# Patient Record
Sex: FEMALE | Race: WHITE | Employment: OTHER | URBAN - METROPOLITAN AREA
[De-identification: names, ages, dates, MRNs, and addresses within clinical notes are randomized per-mention and may not be internally consistent; named-entity substitution may affect disease eponyms.]

---

## 2017-02-10 ENCOUNTER — HOME HEALTH ADMISSION (OUTPATIENT)
Dept: HOME HEALTH SERVICES | Facility: HOME HEALTH | Age: 82
End: 2017-02-10
Payer: MEDICARE

## 2017-02-14 ENCOUNTER — HOME CARE VISIT (OUTPATIENT)
Dept: SCHEDULING | Facility: HOME HEALTH | Age: 82
End: 2017-02-14
Payer: MEDICARE

## 2017-02-14 VITALS
RESPIRATION RATE: 16 BRPM | OXYGEN SATURATION: 92 % | DIASTOLIC BLOOD PRESSURE: 78 MMHG | SYSTOLIC BLOOD PRESSURE: 136 MMHG | HEART RATE: 79 BPM | TEMPERATURE: 98.7 F

## 2017-02-14 PROCEDURE — 3331090002 HH PPS REVENUE DEBIT

## 2017-02-14 PROCEDURE — 400013 HH SOC

## 2017-02-14 PROCEDURE — 3331090001 HH PPS REVENUE CREDIT

## 2017-02-14 PROCEDURE — G0151 HHCP-SERV OF PT,EA 15 MIN: HCPCS

## 2017-02-15 PROCEDURE — 3331090001 HH PPS REVENUE CREDIT

## 2017-02-15 PROCEDURE — 3331090002 HH PPS REVENUE DEBIT

## 2017-02-16 ENCOUNTER — HOME CARE VISIT (OUTPATIENT)
Dept: SCHEDULING | Facility: HOME HEALTH | Age: 82
End: 2017-02-16
Payer: MEDICARE

## 2017-02-16 VITALS
DIASTOLIC BLOOD PRESSURE: 64 MMHG | OXYGEN SATURATION: 97 % | TEMPERATURE: 97.1 F | HEART RATE: 70 BPM | SYSTOLIC BLOOD PRESSURE: 126 MMHG

## 2017-02-16 PROCEDURE — G0157 HHC PT ASSISTANT EA 15: HCPCS

## 2017-02-16 PROCEDURE — 3331090002 HH PPS REVENUE DEBIT

## 2017-02-16 PROCEDURE — 3331090001 HH PPS REVENUE CREDIT

## 2017-02-16 PROCEDURE — G0299 HHS/HOSPICE OF RN EA 15 MIN: HCPCS

## 2017-02-17 VITALS
HEART RATE: 70 BPM | OXYGEN SATURATION: 96 % | DIASTOLIC BLOOD PRESSURE: 64 MMHG | SYSTOLIC BLOOD PRESSURE: 126 MMHG | RESPIRATION RATE: 16 BRPM | TEMPERATURE: 97.1 F

## 2017-02-17 PROCEDURE — 3331090002 HH PPS REVENUE DEBIT

## 2017-02-17 PROCEDURE — 3331090001 HH PPS REVENUE CREDIT

## 2017-02-18 PROCEDURE — 3331090001 HH PPS REVENUE CREDIT

## 2017-02-18 PROCEDURE — 3331090002 HH PPS REVENUE DEBIT

## 2017-02-19 PROCEDURE — 3331090002 HH PPS REVENUE DEBIT

## 2017-02-19 PROCEDURE — 3331090001 HH PPS REVENUE CREDIT

## 2017-02-20 PROCEDURE — 3331090001 HH PPS REVENUE CREDIT

## 2017-02-20 PROCEDURE — 3331090002 HH PPS REVENUE DEBIT

## 2017-02-21 ENCOUNTER — HOME CARE VISIT (OUTPATIENT)
Dept: SCHEDULING | Facility: HOME HEALTH | Age: 82
End: 2017-02-21
Payer: MEDICARE

## 2017-02-21 VITALS
HEART RATE: 55 BPM | TEMPERATURE: 98 F | SYSTOLIC BLOOD PRESSURE: 130 MMHG | OXYGEN SATURATION: 96 % | DIASTOLIC BLOOD PRESSURE: 70 MMHG

## 2017-02-21 PROCEDURE — 3331090001 HH PPS REVENUE CREDIT

## 2017-02-21 PROCEDURE — 3331090002 HH PPS REVENUE DEBIT

## 2017-02-21 PROCEDURE — G0157 HHC PT ASSISTANT EA 15: HCPCS

## 2017-02-22 ENCOUNTER — HOME CARE VISIT (OUTPATIENT)
Dept: SCHEDULING | Facility: HOME HEALTH | Age: 82
End: 2017-02-22
Payer: MEDICARE

## 2017-02-22 PROCEDURE — 3331090002 HH PPS REVENUE DEBIT

## 2017-02-22 PROCEDURE — 3331090001 HH PPS REVENUE CREDIT

## 2017-02-23 ENCOUNTER — HOME CARE VISIT (OUTPATIENT)
Dept: SCHEDULING | Facility: HOME HEALTH | Age: 82
End: 2017-02-23
Payer: MEDICARE

## 2017-02-23 PROCEDURE — G0157 HHC PT ASSISTANT EA 15: HCPCS

## 2017-02-23 PROCEDURE — 3331090001 HH PPS REVENUE CREDIT

## 2017-02-23 PROCEDURE — 3331090002 HH PPS REVENUE DEBIT

## 2017-02-24 VITALS
DIASTOLIC BLOOD PRESSURE: 76 MMHG | OXYGEN SATURATION: 96 % | HEART RATE: 80 BPM | TEMPERATURE: 98.7 F | SYSTOLIC BLOOD PRESSURE: 132 MMHG

## 2017-02-24 PROCEDURE — 3331090001 HH PPS REVENUE CREDIT

## 2017-02-24 PROCEDURE — 3331090002 HH PPS REVENUE DEBIT

## 2017-02-25 PROCEDURE — 3331090001 HH PPS REVENUE CREDIT

## 2017-02-25 PROCEDURE — 3331090002 HH PPS REVENUE DEBIT

## 2017-02-26 PROCEDURE — 3331090002 HH PPS REVENUE DEBIT

## 2017-02-26 PROCEDURE — 3331090001 HH PPS REVENUE CREDIT

## 2017-02-27 PROCEDURE — 3331090001 HH PPS REVENUE CREDIT

## 2017-02-27 PROCEDURE — 3331090002 HH PPS REVENUE DEBIT

## 2017-02-28 ENCOUNTER — HOME CARE VISIT (OUTPATIENT)
Dept: SCHEDULING | Facility: HOME HEALTH | Age: 82
End: 2017-02-28
Payer: MEDICARE

## 2017-02-28 PROCEDURE — G0157 HHC PT ASSISTANT EA 15: HCPCS

## 2017-02-28 PROCEDURE — 3331090001 HH PPS REVENUE CREDIT

## 2017-02-28 PROCEDURE — 3331090002 HH PPS REVENUE DEBIT

## 2017-03-01 VITALS
HEART RATE: 68 BPM | DIASTOLIC BLOOD PRESSURE: 78 MMHG | TEMPERATURE: 97.3 F | OXYGEN SATURATION: 99 % | SYSTOLIC BLOOD PRESSURE: 148 MMHG

## 2017-03-01 PROCEDURE — 3331090001 HH PPS REVENUE CREDIT

## 2017-03-01 PROCEDURE — 3331090002 HH PPS REVENUE DEBIT

## 2017-03-02 ENCOUNTER — HOME CARE VISIT (OUTPATIENT)
Dept: SCHEDULING | Facility: HOME HEALTH | Age: 82
End: 2017-03-02
Payer: MEDICARE

## 2017-03-02 VITALS
TEMPERATURE: 97.7 F | SYSTOLIC BLOOD PRESSURE: 140 MMHG | DIASTOLIC BLOOD PRESSURE: 68 MMHG | OXYGEN SATURATION: 99 % | HEART RATE: 72 BPM

## 2017-03-02 PROCEDURE — 3331090002 HH PPS REVENUE DEBIT

## 2017-03-02 PROCEDURE — G0157 HHC PT ASSISTANT EA 15: HCPCS

## 2017-03-02 PROCEDURE — 3331090001 HH PPS REVENUE CREDIT

## 2017-03-03 PROCEDURE — 3331090002 HH PPS REVENUE DEBIT

## 2017-03-03 PROCEDURE — 3331090001 HH PPS REVENUE CREDIT

## 2017-03-04 PROCEDURE — 3331090002 HH PPS REVENUE DEBIT

## 2017-03-04 PROCEDURE — 3331090001 HH PPS REVENUE CREDIT

## 2017-03-05 PROCEDURE — 3331090001 HH PPS REVENUE CREDIT

## 2017-03-05 PROCEDURE — 3331090002 HH PPS REVENUE DEBIT

## 2017-03-06 ENCOUNTER — HOME CARE VISIT (OUTPATIENT)
Dept: SCHEDULING | Facility: HOME HEALTH | Age: 82
End: 2017-03-06
Payer: MEDICARE

## 2017-03-06 VITALS
DIASTOLIC BLOOD PRESSURE: 74 MMHG | SYSTOLIC BLOOD PRESSURE: 128 MMHG | HEART RATE: 60 BPM | TEMPERATURE: 97.7 F | OXYGEN SATURATION: 98 %

## 2017-03-06 PROCEDURE — 3331090002 HH PPS REVENUE DEBIT

## 2017-03-06 PROCEDURE — G0157 HHC PT ASSISTANT EA 15: HCPCS

## 2017-03-06 PROCEDURE — 3331090001 HH PPS REVENUE CREDIT

## 2017-03-07 PROCEDURE — 3331090002 HH PPS REVENUE DEBIT

## 2017-03-07 PROCEDURE — 3331090001 HH PPS REVENUE CREDIT

## 2017-03-08 ENCOUNTER — HOME CARE VISIT (OUTPATIENT)
Dept: SCHEDULING | Facility: HOME HEALTH | Age: 82
End: 2017-03-08
Payer: MEDICARE

## 2017-03-08 VITALS
HEART RATE: 82 BPM | DIASTOLIC BLOOD PRESSURE: 80 MMHG | TEMPERATURE: 96.7 F | OXYGEN SATURATION: 91 % | RESPIRATION RATE: 18 BRPM | SYSTOLIC BLOOD PRESSURE: 122 MMHG

## 2017-03-08 PROCEDURE — 3331090001 HH PPS REVENUE CREDIT

## 2017-03-08 PROCEDURE — G0151 HHCP-SERV OF PT,EA 15 MIN: HCPCS

## 2017-03-08 PROCEDURE — 3331090002 HH PPS REVENUE DEBIT

## 2017-09-07 PROBLEM — E11.9 TYPE 2 DIABETES MELLITUS WITHOUT COMPLICATION, WITHOUT LONG-TERM CURRENT USE OF INSULIN (HCC): Status: ACTIVE | Noted: 2017-09-07

## 2017-11-09 ENCOUNTER — APPOINTMENT (OUTPATIENT)
Dept: CT IMAGING | Age: 82
DRG: 065 | End: 2017-11-09
Attending: EMERGENCY MEDICINE
Payer: MEDICARE

## 2017-11-09 ENCOUNTER — HOSPITAL ENCOUNTER (INPATIENT)
Age: 82
LOS: 5 days | Discharge: NURSING, REHAB OR LONG-TERM CARE | DRG: 065 | End: 2017-11-14
Attending: EMERGENCY MEDICINE | Admitting: INTERNAL MEDICINE
Payer: MEDICARE

## 2017-11-09 ENCOUNTER — APPOINTMENT (OUTPATIENT)
Dept: GENERAL RADIOLOGY | Age: 82
DRG: 065 | End: 2017-11-09
Attending: EMERGENCY MEDICINE
Payer: MEDICARE

## 2017-11-09 DIAGNOSIS — R40.4 TRANSIENT ALTERATION OF AWARENESS: Primary | ICD-10-CM

## 2017-11-09 DIAGNOSIS — N39.0 URINARY TRACT INFECTION WITHOUT HEMATURIA, SITE UNSPECIFIED: ICD-10-CM

## 2017-11-09 PROBLEM — I16.1 HYPERTENSIVE EMERGENCY: Status: ACTIVE | Noted: 2017-11-09

## 2017-11-09 PROBLEM — G45.9 TIA (TRANSIENT ISCHEMIC ATTACK): Status: ACTIVE | Noted: 2017-11-09

## 2017-11-09 PROBLEM — R53.1 WEAKNESS: Status: ACTIVE | Noted: 2017-11-09

## 2017-11-09 LAB
ALBUMIN SERPL-MCNC: 3.8 G/DL (ref 3.2–4.6)
ALBUMIN/GLOB SERPL: 1.1 {RATIO} (ref 1.2–3.5)
ALP SERPL-CCNC: 63 U/L (ref 50–136)
ALT SERPL-CCNC: 15 U/L (ref 12–65)
ANION GAP SERPL CALC-SCNC: 5 MMOL/L (ref 7–16)
AST SERPL-CCNC: 17 U/L (ref 15–37)
ATRIAL RATE: 55 BPM
BACTERIA URNS QL MICRO: ABNORMAL /HPF
BASOPHILS # BLD: 0 K/UL (ref 0–0.2)
BASOPHILS NFR BLD: 0 % (ref 0–2)
BILIRUB SERPL-MCNC: 0.7 MG/DL (ref 0.2–1.1)
BUN SERPL-MCNC: 25 MG/DL (ref 8–23)
CALCIUM SERPL-MCNC: 10.9 MG/DL (ref 8.3–10.4)
CALCULATED P AXIS, ECG09: 106 DEGREES
CALCULATED R AXIS, ECG10: -57 DEGREES
CALCULATED T AXIS, ECG11: -11 DEGREES
CASTS URNS QL MICRO: ABNORMAL /LPF
CHLORIDE SERPL-SCNC: 107 MMOL/L (ref 98–107)
CO2 SERPL-SCNC: 28 MMOL/L (ref 21–32)
CREAT SERPL-MCNC: 1.14 MG/DL (ref 0.6–1)
DIAGNOSIS, 93000: NORMAL
DIFFERENTIAL METHOD BLD: ABNORMAL
EOSINOPHIL # BLD: 0.1 K/UL (ref 0–0.8)
EOSINOPHIL NFR BLD: 1 % (ref 0.5–7.8)
EPI CELLS #/AREA URNS HPF: 0 /HPF
ERYTHROCYTE [DISTWIDTH] IN BLOOD BY AUTOMATED COUNT: 14.5 % (ref 11.9–14.6)
GLOBULIN SER CALC-MCNC: 3.6 G/DL (ref 2.3–3.5)
GLUCOSE SERPL-MCNC: 96 MG/DL (ref 65–100)
HCT VFR BLD AUTO: 40.8 % (ref 35.8–46.3)
HGB BLD-MCNC: 13.6 G/DL (ref 11.7–15.4)
IMM GRANULOCYTES # BLD: 0 K/UL (ref 0–0.5)
IMM GRANULOCYTES NFR BLD AUTO: 0 % (ref 0–5)
INR PPP: 1 (ref 0.9–1.2)
LYMPHOCYTES # BLD: 1.7 K/UL (ref 0.5–4.6)
LYMPHOCYTES NFR BLD: 25 % (ref 13–44)
MCH RBC QN AUTO: 29.6 PG (ref 26.1–32.9)
MCHC RBC AUTO-ENTMCNC: 33.3 G/DL (ref 31.4–35)
MCV RBC AUTO: 88.9 FL (ref 79.6–97.8)
MONOCYTES # BLD: 0.6 K/UL (ref 0.1–1.3)
MONOCYTES NFR BLD: 9 % (ref 4–12)
NEUTS SEG # BLD: 4.4 K/UL (ref 1.7–8.2)
NEUTS SEG NFR BLD: 65 % (ref 43–78)
P-R INTERVAL, ECG05: 206 MS
PLATELET # BLD AUTO: 249 K/UL (ref 150–450)
PMV BLD AUTO: 10.6 FL (ref 10.8–14.1)
POTASSIUM SERPL-SCNC: 4.5 MMOL/L (ref 3.5–5.1)
PROT SERPL-MCNC: 7.4 G/DL (ref 6.3–8.2)
PROTHROMBIN TIME: 10.5 SEC (ref 9.6–12)
Q-T INTERVAL, ECG07: 462 MS
QRS DURATION, ECG06: 116 MS
QTC CALCULATION (BEZET), ECG08: 441 MS
RBC # BLD AUTO: 4.59 M/UL (ref 4.05–5.25)
RBC #/AREA URNS HPF: ABNORMAL /HPF
SODIUM SERPL-SCNC: 140 MMOL/L (ref 136–145)
VENTRICULAR RATE, ECG03: 55 BPM
WBC # BLD AUTO: 6.8 K/UL (ref 4.3–11.1)
WBC URNS QL MICRO: ABNORMAL /HPF

## 2017-11-09 PROCEDURE — 87186 SC STD MICRODIL/AGAR DIL: CPT | Performed by: EMERGENCY MEDICINE

## 2017-11-09 PROCEDURE — 93005 ELECTROCARDIOGRAM TRACING: CPT | Performed by: EMERGENCY MEDICINE

## 2017-11-09 PROCEDURE — 70450 CT HEAD/BRAIN W/O DYE: CPT

## 2017-11-09 PROCEDURE — 96366 THER/PROPH/DIAG IV INF ADDON: CPT | Performed by: EMERGENCY MEDICINE

## 2017-11-09 PROCEDURE — 74011000258 HC RX REV CODE- 258: Performed by: EMERGENCY MEDICINE

## 2017-11-09 PROCEDURE — 74011250636 HC RX REV CODE- 250/636: Performed by: EMERGENCY MEDICINE

## 2017-11-09 PROCEDURE — 71010 XR CHEST PORT: CPT

## 2017-11-09 PROCEDURE — 81015 MICROSCOPIC EXAM OF URINE: CPT | Performed by: EMERGENCY MEDICINE

## 2017-11-09 PROCEDURE — 87088 URINE BACTERIA CULTURE: CPT | Performed by: EMERGENCY MEDICINE

## 2017-11-09 PROCEDURE — 87086 URINE CULTURE/COLONY COUNT: CPT | Performed by: EMERGENCY MEDICINE

## 2017-11-09 PROCEDURE — 85610 PROTHROMBIN TIME: CPT | Performed by: EMERGENCY MEDICINE

## 2017-11-09 PROCEDURE — 74011000250 HC RX REV CODE- 250: Performed by: INTERNAL MEDICINE

## 2017-11-09 PROCEDURE — 65610000006 HC RM INTENSIVE CARE

## 2017-11-09 PROCEDURE — 99285 EMERGENCY DEPT VISIT HI MDM: CPT | Performed by: EMERGENCY MEDICINE

## 2017-11-09 PROCEDURE — 80053 COMPREHEN METABOLIC PANEL: CPT | Performed by: EMERGENCY MEDICINE

## 2017-11-09 PROCEDURE — 96367 TX/PROPH/DG ADDL SEQ IV INF: CPT | Performed by: EMERGENCY MEDICINE

## 2017-11-09 PROCEDURE — 96365 THER/PROPH/DIAG IV INF INIT: CPT | Performed by: EMERGENCY MEDICINE

## 2017-11-09 PROCEDURE — 93005 ELECTROCARDIOGRAM TRACING: CPT | Performed by: INTERNAL MEDICINE

## 2017-11-09 PROCEDURE — 85025 COMPLETE CBC W/AUTO DIFF WBC: CPT | Performed by: EMERGENCY MEDICINE

## 2017-11-09 RX ORDER — NICARDIPINE HYDROCHLORIDE 0.1 MG/ML
5-15 INJECTION INTRAVENOUS
Status: DISCONTINUED | OUTPATIENT
Start: 2017-11-09 | End: 2017-11-10

## 2017-11-09 RX ORDER — ROPINIROLE 0.5 MG/1
0.5 TABLET, FILM COATED ORAL
COMMUNITY

## 2017-11-09 RX ORDER — SODIUM CHLORIDE 0.9 % (FLUSH) 0.9 %
5-10 SYRINGE (ML) INJECTION AS NEEDED
Status: DISCONTINUED | OUTPATIENT
Start: 2017-11-09 | End: 2017-11-13 | Stop reason: SDUPTHER

## 2017-11-09 RX ORDER — SODIUM CHLORIDE 0.9 % (FLUSH) 0.9 %
5-10 SYRINGE (ML) INJECTION EVERY 8 HOURS
Status: DISCONTINUED | OUTPATIENT
Start: 2017-11-09 | End: 2017-11-13 | Stop reason: SDUPTHER

## 2017-11-09 RX ORDER — ACETAMINOPHEN 325 MG/1
650 TABLET ORAL
Status: DISCONTINUED | OUTPATIENT
Start: 2017-11-09 | End: 2017-11-14 | Stop reason: HOSPADM

## 2017-11-09 RX ADMIN — NICARDIPINE HYDROCHLORIDE 5 MG/HR: 0.1 INJECTION, SOLUTION INTRAVENOUS at 22:05

## 2017-11-09 RX ADMIN — CEFTRIAXONE SODIUM 1 G: 1 INJECTION, POWDER, FOR SOLUTION INTRAMUSCULAR; INTRAVENOUS at 20:56

## 2017-11-09 NOTE — Clinical Note
Status[de-identified] Inpatient [101] Type of Bed: Intensive Care [6] Inpatient Hospitalization Certified Necessary for the Following Reasons: 4. Patient requires ICU level of care interventions (further clarification in H&P documentation) Admitting Diagnosis: Hypertensive emergency [2486413] Admitting Diagnosis: UTI (urinary tract infection) [594898] Admitting Physician: Jyothi Torrez [[id number]] Attending Physician: Jyothi Torrez [[id number]] Estimated Length of Stay: 3-4 Midnights Discharge Plan[de-identified] Extended Care Facility (e.g. Adult Home, Nursing Home, etc.)

## 2017-11-09 NOTE — ED TRIAGE NOTES
Pt arrives EMS for general weakness, difficulty ambulating. Pt states she has no complaints, no pain, no cough, no fever. Didn't feel like getting out of bed, felt more drowsy today than normal. Temp 97.3 oral for EMS, , HR 60s, /78. Pt has not eaten or drank much today. Pt's family states she had morning medications. Pt's family also states she normally walks with a walker but did not have energy to do that today. Pt has history of strokes, last one was 5-6 years ago. Family states symptoms this morning were she was having trouble moving left side. Family states they have noticed a left sided droop and she has been somewhat drooling from that side today. Last seen normal yesterday. Pt states symptoms began this morning and last night she felt fine.

## 2017-11-10 ENCOUNTER — APPOINTMENT (OUTPATIENT)
Dept: MRI IMAGING | Age: 82
DRG: 065 | End: 2017-11-10
Attending: INTERNAL MEDICINE
Payer: MEDICARE

## 2017-11-10 ENCOUNTER — APPOINTMENT (OUTPATIENT)
Dept: ULTRASOUND IMAGING | Age: 82
DRG: 065 | End: 2017-11-10
Attending: INTERNAL MEDICINE
Payer: MEDICARE

## 2017-11-10 PROBLEM — I63.9 ISCHEMIC STROKE OF FRONTAL LOBE (HCC): Status: ACTIVE | Noted: 2017-11-10

## 2017-11-10 LAB
ANION GAP SERPL CALC-SCNC: 11 MMOL/L (ref 7–16)
ATRIAL RATE: 61 BPM
BUN SERPL-MCNC: 22 MG/DL (ref 8–23)
CALCIUM SERPL-MCNC: 10.1 MG/DL (ref 8.3–10.4)
CALCULATED P AXIS, ECG09: 92 DEGREES
CALCULATED R AXIS, ECG10: -65 DEGREES
CALCULATED T AXIS, ECG11: 13 DEGREES
CHLORIDE SERPL-SCNC: 107 MMOL/L (ref 98–107)
CHOLEST SERPL-MCNC: 189 MG/DL
CO2 SERPL-SCNC: 22 MMOL/L (ref 21–32)
CREAT SERPL-MCNC: 1.04 MG/DL (ref 0.6–1)
DIAGNOSIS, 93000: NORMAL
EST. AVERAGE GLUCOSE BLD GHB EST-MCNC: 100 MG/DL
GLUCOSE BLD STRIP.AUTO-MCNC: 117 MG/DL (ref 65–100)
GLUCOSE SERPL-MCNC: 78 MG/DL (ref 65–100)
HBA1C MFR BLD: 5.1 % (ref 4.8–6)
HDLC SERPL-MCNC: 68 MG/DL (ref 40–60)
HDLC SERPL: 2.8 {RATIO}
LDLC SERPL CALC-MCNC: 109.8 MG/DL
LIPID PROFILE,FLP: ABNORMAL
P-R INTERVAL, ECG05: 196 MS
POTASSIUM SERPL-SCNC: 4.4 MMOL/L (ref 3.5–5.1)
Q-T INTERVAL, ECG07: 418 MS
QRS DURATION, ECG06: 116 MS
QTC CALCULATION (BEZET), ECG08: 420 MS
SODIUM SERPL-SCNC: 140 MMOL/L (ref 136–145)
TRIGL SERPL-MCNC: 56 MG/DL (ref 35–150)
TSH SERPL DL<=0.005 MIU/L-ACNC: 1.38 UIU/ML (ref 0.36–3.74)
VENTRICULAR RATE, ECG03: 61 BPM
VLDLC SERPL CALC-MCNC: 11.2 MG/DL (ref 6–23)

## 2017-11-10 PROCEDURE — 74011250637 HC RX REV CODE- 250/637: Performed by: INTERNAL MEDICINE

## 2017-11-10 PROCEDURE — 83036 HEMOGLOBIN GLYCOSYLATED A1C: CPT | Performed by: INTERNAL MEDICINE

## 2017-11-10 PROCEDURE — 93306 TTE W/DOPPLER COMPLETE: CPT

## 2017-11-10 PROCEDURE — 74011000258 HC RX REV CODE- 258: Performed by: INTERNAL MEDICINE

## 2017-11-10 PROCEDURE — 97161 PT EVAL LOW COMPLEX 20 MIN: CPT

## 2017-11-10 PROCEDURE — 80061 LIPID PANEL: CPT | Performed by: INTERNAL MEDICINE

## 2017-11-10 PROCEDURE — 36415 COLL VENOUS BLD VENIPUNCTURE: CPT | Performed by: INTERNAL MEDICINE

## 2017-11-10 PROCEDURE — 92610 EVALUATE SWALLOWING FUNCTION: CPT

## 2017-11-10 PROCEDURE — 97112 NEUROMUSCULAR REEDUCATION: CPT

## 2017-11-10 PROCEDURE — 65660000000 HC RM CCU STEPDOWN

## 2017-11-10 PROCEDURE — 84443 ASSAY THYROID STIM HORMONE: CPT | Performed by: INTERNAL MEDICINE

## 2017-11-10 PROCEDURE — 97162 PT EVAL MOD COMPLEX 30 MIN: CPT

## 2017-11-10 PROCEDURE — 82962 GLUCOSE BLOOD TEST: CPT

## 2017-11-10 PROCEDURE — 77030019605

## 2017-11-10 PROCEDURE — 74011250636 HC RX REV CODE- 250/636: Performed by: INTERNAL MEDICINE

## 2017-11-10 PROCEDURE — 97166 OT EVAL MOD COMPLEX 45 MIN: CPT

## 2017-11-10 PROCEDURE — 70551 MRI BRAIN STEM W/O DYE: CPT

## 2017-11-10 PROCEDURE — 80048 BASIC METABOLIC PNL TOTAL CA: CPT | Performed by: INTERNAL MEDICINE

## 2017-11-10 PROCEDURE — 93880 EXTRACRANIAL BILAT STUDY: CPT

## 2017-11-10 RX ORDER — HYDRALAZINE HYDROCHLORIDE 20 MG/ML
10 INJECTION INTRAMUSCULAR; INTRAVENOUS
Status: DISCONTINUED | OUTPATIENT
Start: 2017-11-10 | End: 2017-11-14 | Stop reason: HOSPADM

## 2017-11-10 RX ORDER — DONEPEZIL HYDROCHLORIDE 5 MG/1
5 TABLET, FILM COATED ORAL
Status: DISCONTINUED | OUTPATIENT
Start: 2017-11-10 | End: 2017-11-14 | Stop reason: HOSPADM

## 2017-11-10 RX ORDER — SODIUM CHLORIDE 9 MG/ML
75 INJECTION, SOLUTION INTRAVENOUS CONTINUOUS
Status: DISCONTINUED | OUTPATIENT
Start: 2017-11-10 | End: 2017-11-11

## 2017-11-10 RX ORDER — ROPINIROLE 0.5 MG/1
0.5 TABLET, FILM COATED ORAL
Status: DISCONTINUED | OUTPATIENT
Start: 2017-11-10 | End: 2017-11-14 | Stop reason: HOSPADM

## 2017-11-10 RX ORDER — HEPARIN SODIUM 5000 [USP'U]/ML
5000 INJECTION, SOLUTION INTRAVENOUS; SUBCUTANEOUS EVERY 8 HOURS
Status: DISCONTINUED | OUTPATIENT
Start: 2017-11-10 | End: 2017-11-14 | Stop reason: HOSPADM

## 2017-11-10 RX ORDER — SODIUM CHLORIDE 0.9 % (FLUSH) 0.9 %
5-10 SYRINGE (ML) INJECTION EVERY 8 HOURS
Status: DISCONTINUED | OUTPATIENT
Start: 2017-11-10 | End: 2017-11-14 | Stop reason: HOSPADM

## 2017-11-10 RX ORDER — ATORVASTATIN CALCIUM 40 MG/1
80 TABLET, FILM COATED ORAL
Status: DISCONTINUED | OUTPATIENT
Start: 2017-11-10 | End: 2017-11-14 | Stop reason: HOSPADM

## 2017-11-10 RX ORDER — ASPIRIN 81 MG/1
81 TABLET ORAL DAILY
Status: DISCONTINUED | OUTPATIENT
Start: 2017-11-10 | End: 2017-11-10

## 2017-11-10 RX ORDER — VALSARTAN 160 MG/1
160 TABLET ORAL DAILY
Status: DISCONTINUED | OUTPATIENT
Start: 2017-11-10 | End: 2017-11-14 | Stop reason: HOSPADM

## 2017-11-10 RX ORDER — ASPIRIN 325 MG
325 TABLET ORAL DAILY
Status: DISCONTINUED | OUTPATIENT
Start: 2017-11-11 | End: 2017-11-14 | Stop reason: HOSPADM

## 2017-11-10 RX ORDER — SODIUM CHLORIDE 0.9 % (FLUSH) 0.9 %
5-10 SYRINGE (ML) INJECTION AS NEEDED
Status: DISCONTINUED | OUTPATIENT
Start: 2017-11-10 | End: 2017-11-14 | Stop reason: HOSPADM

## 2017-11-10 RX ADMIN — SODIUM CHLORIDE 75 ML/HR: 900 INJECTION, SOLUTION INTRAVENOUS at 16:48

## 2017-11-10 RX ADMIN — Medication 5 ML: at 14:00

## 2017-11-10 RX ADMIN — HEPARIN SODIUM 5000 UNITS: 5000 INJECTION, SOLUTION INTRAVENOUS; SUBCUTANEOUS at 22:40

## 2017-11-10 RX ADMIN — DONEPEZIL HYDROCHLORIDE 5 MG: 5 TABLET, FILM COATED ORAL at 22:41

## 2017-11-10 RX ADMIN — Medication 10 ML: at 06:00

## 2017-11-10 RX ADMIN — CEFTRIAXONE SODIUM 1 G: 1 INJECTION, POWDER, FOR SOLUTION INTRAMUSCULAR; INTRAVENOUS at 23:09

## 2017-11-10 RX ADMIN — Medication 10 ML: at 22:41

## 2017-11-10 RX ADMIN — VALSARTAN 160 MG: 160 TABLET, FILM COATED ORAL at 16:40

## 2017-11-10 RX ADMIN — ATORVASTATIN CALCIUM 80 MG: 40 TABLET, FILM COATED ORAL at 22:41

## 2017-11-10 RX ADMIN — HEPARIN SODIUM 5000 UNITS: 5000 INJECTION, SOLUTION INTRAVENOUS; SUBCUTANEOUS at 16:40

## 2017-11-10 NOTE — PROGRESS NOTES
Spiritual Care visit. Initial visit. Visited with patient and family member. Affirmed her suffering. Prayed for her health and recovery.     Visit by Luis Alberto Méndez M.Ed., Th.B. ,Staff

## 2017-11-10 NOTE — PROGRESS NOTES
SPEECH PATHOLOGY NOTE:    Speech therapy consult received and appreciated. Attempted bedside evaluation this AM, but patient off floor for MRI. Will re-attempt at later time this date as patient becomes available.      Glenna Bloch, MSP, CCC-SLP, CBIS

## 2017-11-10 NOTE — H&P
HOSPITALIST H&P/CONSULT  NAME:  Erich Humphrey   Age:  80 y.o.  :   1924   MRN:   [id number]  PCP: Renaldo Lundy MD  Consulting MD:  Treatment Team: Attending Provider: Ayo Diamond MD; Primary Nurse: Kurtis Javier RN  HPI:   Asha Duncan is a 79 yo F with pmhx of CVA, cognitive decline, and hypertension, who presents to the ED with confusion and weakness. She is accompanied by her grandson-in-law, Apolinar Llanos. She knows she is at [facility], but she cannot answer what year or month it is Kristy Mazariegos states she normally knows this). Due to confusion, history obtained from Rachel. He reports that he and his wife could not get Ms. Clara Singleton on the phone this morning. When they arrived to her house at ~11:30 AM, she could not get out of bed and walk. She normally ambulates very slowly with a rolling walker. He reports that she seemed confused and had difficulty talking. He also stated that she seemed to lean to the left. She was brought to the ED and found to have a UTI on urinalysis. She had a CT head that did not show any hemorrhage and showed evidence of previous CVA. She denies pain, shortness of breath, or chest pain. She reports she feels 'very sleepy.'      During discussion with Rachel and the patient, she started to have breathing where she appeared to be breath holding. BP was rechecked and was 227/113. BP had been some elevated earlier, but not this elevated. Per Rachel, she lives in a rented mobile home and has been there for years. She does not leave the house unless they take her somewhere. Mayank Milian, patient's granddaughter, and Rachel help her with shopping and assist her with tasks around the house.     Complete ROS done and is as stated in HPI or otherwise negative  Past Medical History:   Diagnosis Date    Essential hypertension, benign 2013    Expressive aphasia 11/10/2013    GERD (gastroesophageal reflux disease)     H/O appendicitis     History of appendicitis     History of bradycardia 2013    History of complete eye exam 2016    History of CVA (cerebrovascular accident) 2013    History of peptic ulcer 2013    Hyperlipidemia     Hypertension     Overactive bladder     Restless leg syndrome     Type II or unspecified type diabetes mellitus without mention of complication, not stated as uncontrolled 2013    Wears dentures       Past Surgical History:   Procedure Laterality Date    HX ABDOMINAL WALL DEFECT REPAIR      peptic ulcer    HX APPENDECTOMY        Prior to Admission Medications   Prescriptions Last Dose Informant Patient Reported? Taking?   aspirin delayed-release 81 mg tablet   Yes Yes   Sig: Take  by mouth daily. donepezil (ARICEPT) 5 mg tablet   No Yes   Sig: TAKE 1 TABLET BY MOUTH EVERY EVENING   rOPINIRole (REQUIP) 0.5 mg tablet   Yes Yes   Sig: Take 0.5 mg by mouth nightly. solifenacin (VESICARE) 10 mg tablet   No Yes   Sig: TAKE 1 TABLET BY MOUTH EVERY DAY   valsartan (DIOVAN) 160 mg tablet   No Yes   Sig: Take 1 Tab by mouth daily.  Indications: hypertension      Facility-Administered Medications: None     No Known Allergies   Social History   Substance Use Topics    Smoking status: Former Smoker     Packs/day: 0.25     Years: 20.00     Quit date: 2000    Smokeless tobacco: Never Used    Alcohol use No      Family History   Problem Relation Age of Onset    Colon Cancer Mother     Stroke Father       Objective:     Visit Vitals    /83    Pulse (!) 58    Temp 98.3 °F (36.8 °C)    Resp 25    Ht 5' 5\" (1.651 m)    Wt 54.4 kg (120 lb)    SpO2 95%    BMI 19.97 kg/m2      Temp (24hrs), Av.3 °F (36.8 °C), Min:98.3 °F (36.8 °C), Max:98.3 °F (36.8 °C)    Patient Vitals for the past 24 hrs:   Temp Pulse Resp BP SpO2   11/10/17 0046 - 81 24 144/67 96 %   11/10/17 0036 - 82 22 136/66 94 %   11/10/17 0026 - 76 20 132/68 94 %   11/10/17 0016 - 80 24 130/61 94 %   11/10/17 0006 - 83 27 148/67 94 %   11/10/17 0002 - 78 25 142/60 94 %   11/09/17 2356 - 83 24 142/60 94 %   11/09/17 2346 - 81 27 149/65 95 %   11/09/17 2336 - 79 25 137/66 95 %   11/09/17 2326 - 80 22 133/63 95 %   11/09/17 2315 - 77 - 139/64 94 %   11/09/17 2305 - 70 21 138/64 95 %   11/09/17 2256 - 70 23 132/60 96 %   11/09/17 2246 - 65 22 138/62 96 %   11/09/17 2237 - 66 29 147/67 97 %   11/09/17 2226 - 65 26 172/77 97 %   11/09/17 2216 - 67 30 163/74 97 %   11/09/17 2211 - 71 30 (!) 230/102 98 %   11/09/17 2200 - (!) 58 25 (!) 216/139 98 %   11/09/17 2130 - (!) 58 25 174/83 95 %   11/09/17 2057 - (!) 54 28 188/80 98 %   11/09/17 1942 - (!) 52 - (!) 189/117 96 %   11/09/17 1925 - (!) 56 - (!) 205/93 97 %   11/09/17 1910 - (!) 54 - (!) 198/113 98 %   11/09/17 1803 - - 24 - -   11/09/17 1802 - (!) 55 - 200/85 98 %   11/09/17 1732 - (!) 54 - 199/87 97 %   11/09/17 1631 98.3 °F (36.8 °C) 60 18 196/84 99 %     Oxygen Therapy  O2 Sat (%): 95 % (11/09/17 2130)  Pulse via Oximetry: 58 beats per minute (11/09/17 2130)  O2 Device: Room air (11/09/17 1631)  Physical Exam:  General:    Alert, cooperative, no distress initially then started having episode of breath holding and attempting to breathe out through closed lips  Head:   Normocephalic, without obvious abnormality, atraumatic. Nose:  Nares normal. No drainage or sinus tenderness. Lungs:   Clear to auscultation bilaterally. No Wheezing or Rhonchi. No rales. Heart:   Regular rate and rhythm,  no murmur, rub or gallop. Abdomen:   Soft, non-tender. Not distended. Bowel sounds normal.   Extremities: No cyanosis. No edema. No clubbing  Skin:     Texture, turgor normal. No rashes or lesions.   Not Jaundiced  Neurologic: Alert and oriented to self and location, arm and leg strength 5/5   Data Review:   Recent Results (from the past 24 hour(s))   CBC WITH AUTOMATED DIFF    Collection Time: 11/09/17  4:41 PM   Result Value Ref Range    WBC 6.8 4.3 - 11.1 K/uL    RBC 4.59 4.05 - 5.25 M/uL    HGB 13.6 11.7 - 15.4 g/dL    HCT 40.8 35.8 - 46.3 %    MCV 88.9 79.6 - 97.8 FL    MCH 29.6 26.1 - 32.9 PG    MCHC 33.3 31.4 - 35.0 g/dL    RDW 14.5 11.9 - 14.6 %    PLATELET 130 576 - 867 K/uL    MPV 10.6 (L) 10.8 - 14.1 FL    DF AUTOMATED      NEUTROPHILS 65 43 - 78 %    LYMPHOCYTES 25 13 - 44 %    MONOCYTES 9 4.0 - 12.0 %    EOSINOPHILS 1 0.5 - 7.8 %    BASOPHILS 0 0.0 - 2.0 %    IMMATURE GRANULOCYTES 0 0.0 - 5.0 %    ABS. NEUTROPHILS 4.4 1.7 - 8.2 K/UL    ABS. LYMPHOCYTES 1.7 0.5 - 4.6 K/UL    ABS. MONOCYTES 0.6 0.1 - 1.3 K/UL    ABS. EOSINOPHILS 0.1 0.0 - 0.8 K/UL    ABS. BASOPHILS 0.0 0.0 - 0.2 K/UL    ABS. IMM. GRANS. 0.0 0.0 - 0.5 K/UL   METABOLIC PANEL, COMPREHENSIVE    Collection Time: 11/09/17  4:41 PM   Result Value Ref Range    Sodium 140 136 - 145 mmol/L    Potassium 4.5 3.5 - 5.1 mmol/L    Chloride 107 98 - 107 mmol/L    CO2 28 21 - 32 mmol/L    Anion gap 5 (L) 7 - 16 mmol/L    Glucose 96 65 - 100 mg/dL    BUN 25 (H) 8 - 23 MG/DL    Creatinine 1.14 (H) 0.6 - 1.0 MG/DL    GFR est AA 57 (L) >60 ml/min/1.73m2    GFR est non-AA 47 (L) >60 ml/min/1.73m2    Calcium 10.9 (H) 8.3 - 10.4 MG/DL    Bilirubin, total 0.7 0.2 - 1.1 MG/DL    ALT (SGPT) 15 12 - 65 U/L    AST (SGOT) 17 15 - 37 U/L    Alk. phosphatase 63 50 - 136 U/L    Protein, total 7.4 6.3 - 8.2 g/dL    Albumin 3.8 3.2 - 4.6 g/dL    Globulin 3.6 (H) 2.3 - 3.5 g/dL    A-G Ratio 1.1 (L) 1.2 - 3.5     PROTHROMBIN TIME + INR    Collection Time: 11/09/17  4:41 PM   Result Value Ref Range    Prothrombin time 10.5 9.6 - 12.0 sec    INR 1.0 0.9 - 1.2     EKG, 12 LEAD, INITIAL    Collection Time: 11/09/17  4:41 PM   Result Value Ref Range    Ventricular Rate 55 BPM    Atrial Rate 55 BPM    P-R Interval 206 ms    QRS Duration 116 ms    Q-T Interval 462 ms    QTC Calculation (Bezet) 441 ms    Calculated P Axis 106 degrees    Calculated R Axis -57 degrees    Calculated T Axis -11 degrees    Diagnosis       !! AGE AND GENDER SPECIFIC ECG ANALYSIS !!   Sinus bradycardia  Pulmonary disease pattern  Right bundle branch block  Left anterior fascicular block  !!! Bifascicular block !!! Abnormal ECG    Confirmed by ST SAHIL RUTLEDGE MD (), LIA AGUILAR ([id number]) on 11/9/2017 6:10:09 PM     URINE MICROSCOPIC    Collection Time: 11/09/17  7:28 PM   Result Value Ref Range    WBC 20-50 0 /hpf    RBC 0-3 0 /hpf    Epithelial cells 0 0 /hpf    Bacteria 4+ (H) 0 /hpf    Casts 3-5 0 /lpf   EKG, 12 LEAD, INITIAL    Collection Time: 11/09/17  9:57 PM   Result Value Ref Range    Ventricular Rate 61 BPM    Atrial Rate 61 BPM    P-R Interval 196 ms    QRS Duration 116 ms    Q-T Interval 418 ms    QTC Calculation (Bezet) 420 ms    Calculated P Axis 92 degrees    Calculated R Axis -65 degrees    Calculated T Axis 13 degrees    Diagnosis       !! AGE AND GENDER SPECIFIC ECG ANALYSIS !! Sinus rhythm with sinus arrhythmia  Right bundle branch block  Left anterior fascicular block  !!! Bifascicular block !!! Abnormal ECG  When compared with ECG of 09-NOV-2017 16:41,  No significant change was found       Imaging /Procedures /Studies   CT HEAD WO CONT   Final Result   Impression:    1. No evidence of hemorrhage. 2. Small bilateral cerebellar hypodensities are likely lacunar infarcts, age   indeterminate. 3. Extensive diffuse chronic changes. XR CHEST PORT   Final Result   IMPRESSION: No acute disease. Cardiomegaly. Assessment and Plan: Active Hospital Problems    Diagnosis Date Noted    UTI (urinary tract infection) 11/09/2017    Weakness 11/09/2017    Hypertensive emergency 11/09/2017    TIA (transient ischemic attack) 11/09/2017       PLAN  · Due to elevating blood pressure and bizarre breathing pattern, will admit to ICU for hypertensive emergency for cardene gtt. · UTI- rocephin q 24 hours. Follow urine culture. · TIA- check MRI brain. Try to control bp with permissive HTN (systolic 448-521). · Check TSH. · Consult PT/ST/OT and case management.   · Place PPD.  · Discussed with Mejia Pardo that she would likely need STR to long term placement. · NPO for now until ST evaluation. Code Status: DNR per patient wishes    Anticipated discharge: 3-4 days pending clinical progress    Signed By: Lissa Gillis.  Dennis Alvarenga MD     November 9, 2017

## 2017-11-10 NOTE — DISCHARGE INSTRUCTIONS

## 2017-11-10 NOTE — PROGRESS NOTES
Care Management Interventions  PCP Verified by CM: Yes  Transition of Care Consult (CM Consult): SNF  Partner SNF: Yes  Physical Therapy Consult: Yes  Occupational Therapy Consult: Yes  Speech Therapy Consult: Yes  Current Support Network: Lives Alone  Confirm Follow Up Transport: Family  Plan discussed with Pt/Family/Caregiver: Yes  Freedom of Choice Offered: Yes  Discharge Location  Discharge Placement: Skilled nursing facility  SW dc screening. SW spoke with pt's granddaughter and her . Pt lives alone. Uses a walker to ambulate. Granddaughter assists with ADLs. Family requested str bed at [facility] or [facility]. They feel that pt will likely need a long term bed. Referrals made in CC and faxed order to [facility]. Awaiting response. Pt has [employer] ins so will need precert.

## 2017-11-10 NOTE — PROGRESS NOTES
Patient in bed resting with no complaints at this time. Patient is alert with some confusion. IV intact and patent with no s/s of infection noted. Respirations even and unlabored with heart rate regular. Patient unable to ambulate independently without assistance. Bed in low locked position with call light within reach and patient instructed to call if assistance is needed. Will continue to monitor.

## 2017-11-10 NOTE — PROGRESS NOTES
Problem: Nutrition Deficit  Goal: *Optimize nutritional status  Nutrition  Reason for assessment: Referral received from nursing admission Malnutrition Screening Tool for recently lost 2-13# without trying and eating poorly due to decreased appetite. Assessment:   Diet order(s): CCHO, pureed  Food/Nutrition Patient History:  The patient is noted to have a h/o weakness. The patient is admitted due to a stoke. The patient is currently undergoing a procedure in her room and is unable to speak with RD. RD spoke with RN and the patient consumed her entire lunch tray of pureed CCHO diet today. SLP is currently following the patient for swallowing difficulties and recent CVA. Weight history in the EMR cannot be verified as accurate due to unknown weight source (pt stated vs estimated vs measured). WT / BMI WEIGHT   11/9/2017 120 lb   9/7/2017 118 lb   2/9/2017 128 lb   According to the EMR the patient has recently gained 2 lbs and lost a total of 8 lbs over the past 10 months. Anthropometrics:Height: 5' 5\" (165.1 cm),  Weight: 54.4 kg (120 lb), weight source: unknown, Body mass index is 19.97 kg/(m^2). BMI class of underweight for age >71. Macronutrient needs:  EER:  1498-7880 kcal /day (25-30 kcal/kg actual BW)  EPR:  57-68 grams protein/day (1-1.2 grams/kg IBW)  Intake/Comparative Standards: Average intake for past 1 RN reported meal: 100%. This potentially meets ~100% of kcal and ~100% of protein needs    Nutrition Diagnosis: Swallowing difficulty related to recent CVA as evidenced by patient currently with dysphagia requiring a modified diet of pureed foods. Intervention:  Meals and snacks: Continue current diet per SLP. Nutrition Supplement Therapy: Add magic cup BID   Nutrition Discharge Plan: too soon to be determined  Coordination of Nutrition Care: Merline Jasper.  Gayla Welsh Asa [address], [address] [place] [address],  [phone]

## 2017-11-10 NOTE — PROGRESS NOTES
STG: Pt will tolerate pureed/thin liquids without overt signs/sx of aspiration with 100% accuracy  STG: Pt will participate with cognitive-linguistic assessment x1  LTG: Pt will tolerate the least restrictive diet at discharge without respiratory compromise    Speech language pathology: bedside swallow note: Initial Assessment    NAME/AGE/GENDER: Butch Davidson is a 80 y.o. female  DATE: 11/10/2017  PRIMARY DIAGNOSIS: Weakness  UTI (urinary tract infection)  Hypertensive emergency  UTI (urinary tract infection)  Hypertensive emergency  UTI (urinary tract infection)       ICD-10: Treatment Diagnosis: dysphagia; oral R13.11  INTERDISCIPLINARY COLLABORATION: Registered Nurse  PRECAUTIONS/ALLERGIES: Review of patient's allergies indicates no known allergies. ASSESSMENT:Based on the objective data described below, Ms. Manish Ahuja presents with no overt signs/sx of aspiration. Sitting up in the chair. She is oriented but with delayed processing noted. Hx of CVA in 2013 with speech therapy following at that time for cognitive deficits but a regular diet recommended at that time. Leaning to the right side with repositioning required several times to achieve midline. She was able to feed herself. No overt signs/sx of aspiration with thin liquids via cup or straw with prompt initiation of the swallow. She ate a container of puree without difficulty. Single swallows per bolus. Patient is edentulous and per family at bedside she does not typically wear her dentures when eating at home. Required greater than 7 minutes to masticate the pears but did not want to expel. Provided with several liquid washes with patient eventually able to clear. Transport was waiting to take the patient for an ultrasound so the session was concluded. Recommend pureed/thin liquids. Meds crushed in puree. Will follow for dysphagia and cognition. RN, pt, and family notified of results and recommendations.     Patient will benefit from skilled intervention to address the below impairments. ?????? ? ? This section established at most recent assessment??????????  PROBLEM LIST (Impairments causing functional limitations):  1. Dysphagia  2. cognition  REHABILITATION POTENTIAL FOR STATED GOALS: Good  PLAN OF CARE:   Patient will benefit from skilled intervention to address the following impairments. RECOMMENDATIONS AND PLANNED INTERVENTIONS (Benefits and precautions of therapy have been discussed with the patient.):  · PO:  Pureed  · Liquids:  regular thin  MEDICATIONS:  · Crushed in puree  COMPENSATORY STRATEGIES/MODIFICATIONS INCLUDING:  · Small sips and bites  OTHER RECOMMENDATIONS (including follow up treatment recommendations): · Family training/education  · Patient education  RECOMMENDED DIET MODIFICATIONS DISCUSSED WITH:  · Nursing  · Patient  FREQUENCY/DURATION: Continue to follow patient 3 times a week for duration of hospital stay to address above goals. RECOMMENDED REHABILITATION/EQUIPMENT: (at time of discharge pending progress): Due to the probability of continued deficits (see above) this patient will likely need continued skilled speech therapy after discharge. SUBJECTIVE:   Pleasant and cooperative. Happy to eat. History of Present Injury/Illness: Ms. Diego Garcia  has a past medical history of Essential hypertension, benign (11/8/2013); Expressive aphasia (11/10/2013); GERD (gastroesophageal reflux disease); H/O appendicitis; History of appendicitis; History of bradycardia (11/8/2013); History of complete eye exam (04/2016); History of CVA (cerebrovascular accident) (11/8/2013); History of peptic ulcer (11/8/2013); Hyperlipidemia; Hypertension; Overactive bladder; Restless leg syndrome; Type II or unspecified type diabetes mellitus without mention of complication, not stated as uncontrolled (11/8/2013); and Wears dentures. She also  has a past surgical history that includes appendectomy and abdominal wall defect repair.    Present Symptoms: prolonged mastication; processing issues  Pain Intensity 1: 0  Current Medications:   No current facility-administered medications on file prior to encounter. Current Outpatient Prescriptions on File Prior to Encounter   Medication Sig Dispense Refill    solifenacin (VESICARE) 10 mg tablet TAKE 1 TABLET BY MOUTH EVERY DAY 90 Tab 3    valsartan (DIOVAN) 160 mg tablet Take 1 Tab by mouth daily. Indications: hypertension 90 Tab 3    donepezil (ARICEPT) 5 mg tablet TAKE 1 TABLET BY MOUTH EVERY EVENING 90 Tab 3    aspirin delayed-release 81 mg tablet Take  by mouth daily. Current Dietary Status:  NPO pending consult      Social History/Home Situation: home alone  Home Environment: Trailer/mobile home  # Steps to Enter: 5  One/Two Story Residence: One story  Living Alone: Yes  Support Systems: Child(rahul), Family member(s)  Patient Expects to be Discharged to[de-identified] Rehabilitation facility  Current DME Used/Available at Home: Vani Wu, Shower chair  OBJECTIVE:   Respiratory Status:  Room air     CXR Results: No acute disease. Cardiomegaly. MRI/CT Results:Acute to early subacute infarct in the right frontal lobe.     2. Old infarcts in the cerebellum and left parietal lobe.     3. Advanced cerebral volume loss with hippocampal atrophy and white matter  findings compatible with chronic small vessel ischemic disease. Oral Motor Structure/Speech:  Oral-Motor Structure/Motor Speech  Labial: No impairment  Dentition: Edentulous  Oral Hygiene: fair  Lingual: Decreased rate    Cognitive and Communication Status:  Neurologic State: Alert  Orientation Level: Oriented to person;Oriented to place;Oriented to situation  Cognition: Memory loss  Perception: Appears intact  Perseveration: No perseveration noted  Safety/Judgement: Fall prevention    BEDSIDE SWALLOW EVALUATION  Oral Assessment:  Oral Assessment  Labial: No impairment  Dentition: Edentulous  Oral Hygiene: fair  Lingual: Decreased rate  P.O.  Trials:  Patient Position: upright in bed    The patient was given tsp to straw amounts of the following:   Consistency Presented: Solid; Thin liquid;Mixed consistency  How Presented: Self-fed/presented;Straw;Cup/sip    ORAL PHASE:  Bolus Acceptance: No impairment  Bolus Formation/Control: Impaired  Propulsion: Delayed (# of seconds)  Type of Impairment: Delayed;Mastication  Oral Residue: Less than 10% of bolus    PHARYNGEAL PHASE:  Initiation of Swallow: Delayed (# of seconds)  Laryngeal Elevation: Functional  Aspiration Signs/Symptoms: None  Vocal Quality: No impairment           Pharyngeal Phase Characteristics: No impairment, issues, or problems     OTHER OBSERVATIONS:  Rate/bite size: WNL   Endurance: WNL     Tool Used: Dysphagia Outcome and Severity Scale (KRISTINE)    Score Comments   Normal Diet  [] 7 With no strategies or extra time needed   Functional Swallow  [] 6 May have mild oral or pharyngeal delay       Mild Dysphagia    [] 5 Which may require one diet consistency restricted (those who demonstrate penetration which is entirely cleared on MBS would be included)   Mild-Moderate Dysphagia  [x] 4 With 1-2 diet consistencies restricted       Moderate Dysphagia  [] 3 With 2 or more diet consistencies restricted       Moderately Severe Dysphagia  [] 2 With partial PO strategies (trials with ST only)       Severe Dysphagia  [] 1 With inability to tolerate any PO safely          Score:  Initial: 4 Most Recent: X (Date: -- )   Interpretation of Tool: The Dysphagia Outcome and Severity Scale (KRISTINE) is a simple, easy-to-use, 7-point scale developed to systematically rate the functional severity of dysphagia based on objective assessment and make recommendations for diet level, independence level, and type of nutrition. Score 7 6 5 4 3 2 1   Modifier CH CI CJ CK CL CM CN   ?  Swallowing:     - CURRENT STATUS: CK - 40%-59% impaired, limited or restricted    - GOAL STATUS:  CJ - 20%-39% impaired, limited or restricted    - D/C STATUS:  ---------------To be determined---------------  Payor: Laurie Sanchez / Plan: [address] [place] [address] HMO / Product Type: Managed Care Medicare /     TREATMENT:    (In addition to Assessment/Re-Assessment sessions the following treatments were rendered)  Assessment/Reassessment only, no treatment provided today  MODALITIES:                                                                    ORAL MOTOR  EXERCISES:                                                                                                                                                                      LARYNGEAL / PHARYNGEAL EXERCISES:                                                                                                                                     __________________________________________________________________________________________________  Safety:   After treatment position/precautions:  · Up in chair  · RN notified  · Family at bedside  Progression/Medical Necessity:   · Skilled intervention continues to be required due to patient still consuming a modified diet and unable to attend/participate in therapy as expected. Compliance with Program/Exercises: Will assess as treatment progresses. Reason for Continuation of Services/Other Comments:  · Patient continues to require skilled intervention due to patient unable to attend/participate in therapy as expected. Recommendations/Intent for next treatment session: \"Treatment next visit will focus on diet tolerance; po trialscognitive assessment\".     Total Treatment Duration:  Time In: 1155  Time Out: Ashwini 17 MS, CCC-SLP

## 2017-11-10 NOTE — PROGRESS NOTES
Problem: Mobility Impaired (Adult and Pediatric)  Goal: *Acute Goals and Plan of Care (Insert Text)  LTG:  (1.)Ms. Sonia Luciano will move from supine to sit and sit to supine, scoot up and down and roll side to side with STAND BY ASSIST within 7 day(s). (2.)Ms. Sonia Luciano will transfer from bed to chair and chair to bed with MINIMAL ASSIST using the least restrictive device within 7 day(s). (3.)Ms. Sonia Luciano will ambulate with MINIMAL ASSIST for 100 feet with the least restrictive device within 7 day(s). (4.)Ms. Sonia Luciano will perform seated exercises and functional activities for 15+ minutes to improve strength and mobility within 7 days. ________________________________________________________________________________________________    PHYSICAL THERAPY: Initial Assessment, AM 11/10/2017  INPATIENT: Hospital Day: 2  Payor: Yusef Martinez / Plan: [address] [place] [address] HMO / Product Type: Managed Care Medicare /      NAME/AGE/GENDER: Katherine Price is a 80 y.o. female   PRIMARY DIAGNOSIS: Weakness  UTI (urinary tract infection)  Hypertensive emergency  UTI (urinary tract infection)  Hypertensive emergency  UTI (urinary tract infection) Ischemic stroke of frontal lobe ([facility]) Ischemic stroke of frontal lobe ([facility] [address]        ICD-10: Treatment Diagnosis:   · Generalized Muscle Weakness (M62.81)  · Other lack of cordination (R27.8)  · Difficulty in walking, Not elsewhere classified (R26.2)  · Other abnormalities of gait and mobility (R26.89)   Precaution/Allergies:  Review of patient's allergies indicates no known allergies. ASSESSMENT:     Ms. Sonia Luciano is a 80year old female admitted from home for UTI, hypertension, and weakness. grandson in law at bedside reports an acute change in strength (especially left side), mobility, and mental status as of the past few days. Pt typically lives alone with family checking in on her and assisting frequently. She ambulates household distances with standard walker and denies falls.  Presents in supine today without complaints of pain. Agreeable to therapy assessment and is eager to get out of bed. Has delayed responses to questions and cues and seems to have difficulty with processing simple one step commands, especially with LE testing. She requires mod assist for rolling and completing supine to sit transfer. Demonstrates posterior trunk lean and needs mostly constant support. Able to take a few small shuffled steps from bed to chair with mod-max assist from therapist and needs max assist with repositioning in chair. Again, LE assessment is difficulty due to poor command following but does appear to have AROM WFL bilaterally. Sensation appears intact to light touch. Unable to perform muscle testing. Can raise both arms through mostly full ROM and  strength appears equal. Ms. Lucian Stein was positioned comfortably in recliner with LEs elevated, needs in reach, and MD present. Family at bedside reports that pt will not be left alone during the day; still reminded them to inform staff if they leave. RN notified. Bradley Paez appears to be functioning well below baseline today. Her biggest deficits appear to be strength and mental status/processing. Anticipate that she will need short term rehab at discharge due to age, living alone, and weakness. This section established at most recent assessment   PROBLEM LIST (Impairments causing functional limitations):  1. Decreased Strength  2. Decreased ADL/Functional Activities  3. Decreased Transfer Abilities  4. Decreased Ambulation Ability/Technique  5. Decreased Balance  6. Increased Pain  7. Decreased Activity Tolerance  8. Decreased Cognition   INTERVENTIONS PLANNED: (Benefits and precautions of physical therapy have been discussed with the patient.)  1. Balance Exercise  2. Bed Mobility  3. Gait Training  4. Therapeutic Activites  5. Therapeutic Exercise/Strengthening  6. Transfer Training  7.  Group Therapy     TREATMENT PLAN: Frequency/Duration: 3 times a week for duration of hospital stay  Rehabilitation Potential For Stated Goals: Good     RECOMMENDED REHABILITATION/EQUIPMENT: (at time of discharge pending progress): Due to the probability of continued deficits (see above) this patient will likely need continued skilled physical therapy after discharge. Equipment:    to be determined pending progress              HISTORY:   History of Present Injury/Illness (Reason for Referral):  Per H&P, Susu Merchant is a 81 yo F with pmhx of CVA, cognitive decline, and hypertension, who presents to the ED with confusion and weakness. She is accompanied by her grandson-in-law, Marcia Greenberg.     She knows she is at [facility], but she cannot answer what year or month it is Kelechi Last states she normally knows this). Due to confusion, history obtained from [employer].     He reports that he and his wife could not get Ms. Leno Felix on the phone this morning. When they arrived to her house at ~11:30 AM, she could not get out of bed and walk. She normally ambulates very slowly with a rolling walker. He reports that she seemed confused and had difficulty talking. He also stated that she seemed to lean to the left. She was brought to the ED and found to have a UTI on urinalysis. She had a CT head that did not show any hemorrhage and showed evidence of previous CVA. She denies pain, shortness of breath, or chest pain. She reports she feels 'very sleepy.'       During discussion with [employer] and the patient, she started to have breathing where she appeared to be breath holding. BP was rechecked and was 227/113. BP had been some elevated earlier, but not this elevated.     Per [employer], she lives in a rented mobile home and has been there for years. She does not leave the house unless they take her somewhere. Cait Shepard, patient's granddaughter, and [employer] help her with shopping and assist her with tasks around the house. \"  Past Medical History/Comorbidities:   Ms. Leno Felix  has a past medical history of Essential hypertension, benign (11/8/2013); Expressive aphasia (11/10/2013); GERD (gastroesophageal reflux disease); H/O appendicitis; History of appendicitis; History of bradycardia (11/8/2013); History of complete eye exam (04/2016); History of CVA (cerebrovascular accident) (11/8/2013); History of peptic ulcer (11/8/2013); Hyperlipidemia; Hypertension; Overactive bladder; Restless leg syndrome; Type II or unspecified type diabetes mellitus without mention of complication, not stated as uncontrolled (11/8/2013); and Wears dentures. Ms. Jayla Beth  has a past surgical history that includes appendectomy and abdominal wall defect repair. Social History/Living Environment:   Home Environment: Trailer/mobile home  # Steps to Enter: 5  One/Two Story Residence: One story  Living Alone: Yes  Support Systems: Child(rahul), Family member(s)  Patient Expects to be Discharged to[de-identified] Rehabilitation facility  Current DME Used/Available at Home: Ja Parish, [address] [facility] [place] [facility] chair  Prior Level of Function/Work/Activity:  Lives alone. Has multiple family members who check on her frequently. Mod I at baseline with short household distances using standard walker. Family assist with all ADLs and with majority of meals. Pt with hx CVAs in the past.      Number of Personal Factors/Comorbidities that affect the Plan of Care: 3+: HIGH COMPLEXITY   EXAMINATION:   Most Recent Physical Functioning:   Gross Assessment:  AROM: Generally decreased, functional  Strength: Generally decreased, functional  Coordination: Generally decreased, functional  Sensation: Intact               Posture:  Posture (WDL): Exceptions to WDL  Posture Assessment: Forward head, Rounded shoulders, Trunk flexion  Balance:  Sitting: Impaired  Sitting - Static: Fair (occasional)  Sitting - Dynamic: Poor (constant support)  Standing: Impaired  Standing - Static: Poor  Standing - Dynamic : Poor Bed Mobility:  Rolling:  Moderate assistance  Supine to Sit: Moderate assistance  Scooting: Moderate assistance  Wheelchair Mobility:     Transfers:  Sit to Stand: Moderate assistance  Stand to Sit: Moderate assistance  Bed to Chair: Moderate assistance;Maximum assistance  Gait:     Base of Support: Center of gravity altered;Narrowed  Speed/Alannah: Pace decreased (<100 feet/min)  Step Length: Left shortened;Right shortened  Gait Abnormalities: Decreased step clearance;Shuffling gait; Path deviations  Distance (ft): 3 Feet (ft)  Assistive Device:  (none)  Interventions: Verbal cues; Safety awareness training; Tactile cues      Body Structures Involved:  1. Nerves  2. Muscles Body Functions Affected:  1. Mental  2. Neuromusculoskeletal  3. Movement Related Activities and Participation Affected:  1. Learning and Applying Knowledge  2. General Tasks and Demands  3. Communication  4. Mobility  5. Self Care  6. Domestic Life  7. Interpersonal Interactions and Relationships  8. Community, Social and [place]   Number of elements that affect the Plan of Care: 4+: HIGH COMPLEXITY   CLINICAL PRESENTATION:   Presentation: Evolving clinical presentation with changing clinical characteristics: MODERATE COMPLEXITY   CLINICAL DECISION MAKIN [facility] Short Form  How much difficulty does the patient currently have. .. Unable A Lot A Little None   1. Turning over in bed (including adjusting bedclothes, sheets and blankets)? [] 1   [] 2   [x] 3   [] 4   2. Sitting down on and standing up from a chair with arms ( e.g., wheelchair, bedside commode, etc.)   [] 1   [x] 2   [] 3   [] 4   3. Moving from lying on back to sitting on the side of the bed? [] 1   [x] 2   [] 3   [] 4   How much help from another person does the patient currently need. .. Total A Lot A Little None   4. Moving to and from a bed to a chair (including a wheelchair)? [] 1   [x] 2   [] 3   [] 4   5. Need to walk in hospital room? [] 1   [x] 2   [] 3   [] 4   6. Climbing 3-5 steps with a railing? [x] 1   [] 2   [] 3   [] 4   © 2007, Trustees of [address] [place] [address] [address] [address], under license to [employer]. All rights reserved      Score:  Initial: 12 Most Recent: X (Date: -- )    Interpretation of Tool:  Represents activities that are increasingly more difficult (i.e. Bed mobility, Transfers, Gait). Score 24 23 22-20 19-15 14-10 9-7 6     Modifier CH CI CJ CK CL CM CN      ? Mobility - Walking and Moving Around:     - CURRENT STATUS: CL - 60%-79% impaired, limited or restricted    - GOAL STATUS: CK - 40%-59% impaired, limited or restricted    - D/C STATUS:  ---------------To be determined---------------  Payor: [employer] MEDICARE / Plan: [address] [place] [address] HMO / Product Type: Managed Care Medicare /      Medical Necessity:     · Patient demonstrates fair rehab potential due to higher previous functional level. Reason for Services/Other Comments:  · Patient continues to demonstrate capacity to improve strength, mobility, balance, transfers, activity tolerance which will increase independence, decrease amount of assistance required from caregiver and increase safety.    Use of outcome tool(s) and clinical judgement create a POC that gives a: Questionable prediction of patient's progress: MODERATE COMPLEXITY            TREATMENT:   (In addition to Assessment/Re-Assessment sessions the following treatments were rendered)   Pre-treatment Symptoms/Complaints:  \"I would like that\"  Pain: Initial:   Pain Intensity 1: 0  Post Session:  0/10     Assessment/Reassessment only, no treatment provided today    Braces/Orthotics/Lines/Etc:   · O2 Device: Room air  Treatment/Session Assessment:    · Response to Treatment:  Pt performs mobility with mod-max assist. Weak with impaired command following at times and impaired processing  · Interdisciplinary Collaboration:   o Physical Therapist  o Registered Nurse  · After treatment position/precautions:   o Up in chair  o Bed/Chair-wheels locked  o Bed in low position  o Caregiver at bedside  o Call light within reach  o RN notified  o Family at bedside  o MD at bedside   · Compliance with Program/Exercises: Will assess as treatment progresses. · Recommendations/Intent for next treatment session: \"Next visit will focus on advancements to more challenging activities and reduction in assistance provided\".   Total Treatment Duration:  PT Patient Time In/Time Out  Time In: 1102  Time Out: 1908 NAKUL ArteagaT

## 2017-11-10 NOTE — PHYSICIAN ADVISORY
Letter of Determination: Upgrade from Observation to Inpatient Status    This patient was originally hospitalized as observation status on 11/09/2017 for acute mental status changes. This patient now meets for Inpatient Admission in accordance with CMS regulation Section 43 .3. Specifically, patient's stay is now over Two Midnights and was medically necessary. The patient's stay was medically necessary based on magnetic resonance imaging of the brain demonstrating right frontal lobe acute cerebral vascular accident, vital signs with a blood pressure of 205/93 mmHg, pulse of 54 beats per minute. Consistent with CMS guidelines, patient meets for inpatient status. It is our recommendation that this patient's hospitalization status should be upgraded from OBSERVATION to INPATIENT status.      The final decision regarding the patient's hospitalization status depends on the attending physician's judgement.     Andres Gordillo MD, YONATAN,   Physician [place].

## 2017-11-10 NOTE — ED NOTES
TRANSFER - OUT REPORT:    Verbal report given to Amanuel Coto on Naga Rosales  being transferred to 8th floor       Report consisted of patients Situation, Background, Assessment and   Recommendations(SBAR). Information from the following report(s) SBAR, ED Summary and MAR was reviewed with the receiving nurse. Lines:   Peripheral IV 11/09/17 Right Antecubital (Active)   Site Assessment Clean, dry, & intact 11/9/2017  4:37 PM   Phlebitis Assessment 0 11/9/2017  4:37 PM   Infiltration Assessment 0 11/9/2017  4:37 PM   Dressing Status Clean, dry, & intact 11/9/2017  4:37 PM       Peripheral IV 11/09/17 Left Antecubital (Active)   Site Assessment Clean, dry, & intact 11/9/2017 10:38 PM   Phlebitis Assessment 0 11/9/2017 10:38 PM   Infiltration Assessment 0 11/9/2017 10:38 PM   Dressing Status Clean, dry, & intact 11/9/2017 10:38 PM        Opportunity for questions and clarification was provided.       Patient transported with:   [employer]

## 2017-11-10 NOTE — ED NOTES
TRANSFER - OUT REPORT:    Verbal report given to [address] 8th floor on Nevin Men  being transferred to 8th floor       Report consisted of patients Situation, Background, Assessment and   Recommendations(SBAR). Information from the following report(s) SBAR, ED Summary and MAR was reviewed with the receiving nurse. Lines:   Peripheral IV 11/09/17 Right Antecubital (Active)   Site Assessment Clean, dry, & intact 11/9/2017  4:37 PM   Phlebitis Assessment 0 11/9/2017  4:37 PM   Infiltration Assessment 0 11/9/2017  4:37 PM   Dressing Status Clean, dry, & intact 11/9/2017  4:37 PM        Opportunity for questions and clarification was provided.       Patient transported with:   [employer]

## 2017-11-10 NOTE — PROGRESS NOTES
Patient stable with no complaints at this time. Patient resting in bed with eyes closed. Family at bedside for support. Call light within reach and patient instructed to call if assistance is needed.   Report to be given to oncoming RN 7p-7a

## 2017-11-10 NOTE — PROGRESS NOTES
TRANSFER - IN REPORT:    Verbal report received from Efra Mcnulty RN on Leroy Guaman  being received from ED for routine progression of care      Report consisted of patients Situation, Background, Assessment and   Recommendations(SBAR). Information from the following report(s) SBAR, Kardex and ED Summary was reviewed with the receiving nurse. Opportunity for questions and clarification was provided. Assessment completed upon patients arrival to unit and care assumed.

## 2017-11-10 NOTE — PROGRESS NOTES
Hospitalist Progress Note    Subjective:   Daily Progress Note: 11/10/2017 11:46 AM    Ms. Efren Dasilva is a 81 yo white female, with a pmh of remote CVAs, who presented 11/9 for increased confusion/forgetfulness, weakness, and a lean to the left. UA indicative of UTI and she is on rocephin day 2. CT head negative for acute abnormality in the ED but MRI Brain reveals acute right frontal ischemic CVA. Takes aspirin 81 mg at home. Also initially with hypertensive emergency that required cardene gtt in the ED but she was weaned off prior to moving upstairs. She denies current sob, chest pain, nausea, fever, chills. Feels weak. Grandson in law at bedside. Current Facility-Administered Medications   Medication Dose Route Frequency    donepezil (ARICEPT) tablet 5 mg  5 mg Oral QHS    rOPINIRole (REQUIP) tablet 0.5 mg  0.5 mg Oral QHS    valsartan (DIOVAN) tablet 160 mg  160 mg Oral DAILY    hydrALAZINE (APRESOLINE) 20 mg/mL injection 10 mg  10 mg IntraVENous Q6H PRN    cefTRIAXone (ROCEPHIN) 1 g in 0.9% sodium chloride (MBP/ADV) 50 mL  1 g IntraVENous Q24H    sodium chloride (NS) flush 5-10 mL  5-10 mL IntraVENous Q8H    sodium chloride (NS) flush 5-10 mL  5-10 mL IntraVENous PRN    0.9% sodium chloride infusion  75 mL/hr IntraVENous CONTINUOUS    [START ON 11/11/2017] aspirin (ASPIRIN) tablet 325 mg  325 mg Oral DAILY    atorvastatin (LIPITOR) tablet 80 mg  80 mg Oral QHS    heparin (porcine) injection 5,000 Units  5,000 Units SubCUTAneous Q8H    sodium chloride (NS) flush 5-10 mL  5-10 mL IntraVENous Q8H    sodium chloride (NS) flush 5-10 mL  5-10 mL IntraVENous PRN    acetaminophen (TYLENOL) tablet 650 mg  650 mg Oral Q4H PRN        Review of Systems  A comprehensive review of systems was negative except for that written in the HPI.     Objective:     Visit Vitals    /70    Pulse 63    Temp 98.2 °F (36.8 °C)    Resp 18    Ht 5' 5\" (1.651 m)    Wt 54.4 kg (120 lb)    SpO2 96%    Breastfeeding No    BMI 19.97 kg/m2      O2 Device: Room air    Temp (24hrs), Av.2 °F (36.8 °C), Min:98.1 °F (36.7 °C), Max:98.3 °F (36.8 °C)              General: awake, alert, oriented to person and place, no acute distress  Eyes; non icteric, EOMI  Neck; supple  CV: RRR  Pulm; CTAb  Abd; soft, non tender  Neuro: cranial nerves II-XII grossly intact, moves all extremities     Additional comments:I reviewed the patient's new clinical lab test results. j    Data Review    Recent Results (from the past 24 hour(s))   CBC WITH AUTOMATED DIFF    Collection Time: 17  4:41 PM   Result Value Ref Range    WBC 6.8 4.3 - 11.1 K/uL    RBC 4.59 4.05 - 5.25 M/uL    HGB 13.6 11.7 - 15.4 g/dL    HCT 40.8 35.8 - 46.3 %    MCV 88.9 79.6 - 97.8 FL    MCH 29.6 26.1 - 32.9 PG    MCHC 33.3 31.4 - 35.0 g/dL    RDW 14.5 11.9 - 14.6 %    PLATELET 198 755 - 497 K/uL    MPV 10.6 (L) 10.8 - 14.1 FL    DF AUTOMATED      NEUTROPHILS 65 43 - 78 %    LYMPHOCYTES 25 13 - 44 %    MONOCYTES 9 4.0 - 12.0 %    EOSINOPHILS 1 0.5 - 7.8 %    BASOPHILS 0 0.0 - 2.0 %    IMMATURE GRANULOCYTES 0 0.0 - 5.0 %    ABS. NEUTROPHILS 4.4 1.7 - 8.2 K/UL    ABS. LYMPHOCYTES 1.7 0.5 - 4.6 K/UL    ABS. MONOCYTES 0.6 0.1 - 1.3 K/UL    ABS. EOSINOPHILS 0.1 0.0 - 0.8 K/UL    ABS. BASOPHILS 0.0 0.0 - 0.2 K/UL    ABS. IMM. GRANS. 0.0 0.0 - 0.5 K/UL   METABOLIC PANEL, COMPREHENSIVE    Collection Time: 17  4:41 PM   Result Value Ref Range    Sodium 140 136 - 145 mmol/L    Potassium 4.5 3.5 - 5.1 mmol/L    Chloride 107 98 - 107 mmol/L    CO2 28 21 - 32 mmol/L    Anion gap 5 (L) 7 - 16 mmol/L    Glucose 96 65 - 100 mg/dL    BUN 25 (H) 8 - 23 MG/DL    Creatinine 1.14 (H) 0.6 - 1.0 MG/DL    GFR est AA 57 (L) >60 ml/min/1.73m2    GFR est non-AA 47 (L) >60 ml/min/1.73m2    Calcium 10.9 (H) 8.3 - 10.4 MG/DL    Bilirubin, total 0.7 0.2 - 1.1 MG/DL    ALT (SGPT) 15 12 - 65 U/L    AST (SGOT) 17 15 - 37 U/L    Alk.  phosphatase 63 50 - 136 U/L    Protein, total 7.4 6.3 - 8.2 g/dL    Albumin 3.8 3.2 - 4.6 g/dL    Globulin 3.6 (H) 2.3 - 3.5 g/dL    A-G Ratio 1.1 (L) 1.2 - 3.5     PROTHROMBIN TIME + INR    Collection Time: 11/09/17  4:41 PM   Result Value Ref Range    Prothrombin time 10.5 9.6 - 12.0 sec    INR 1.0 0.9 - 1.2     EKG, 12 LEAD, INITIAL    Collection Time: 11/09/17  4:41 PM   Result Value Ref Range    Ventricular Rate 55 BPM    Atrial Rate 55 BPM    P-R Interval 206 ms    QRS Duration 116 ms    Q-T Interval 462 ms    QTC Calculation (Bezet) 441 ms    Calculated P Axis 106 degrees    Calculated R Axis -57 degrees    Calculated T Axis -11 degrees    Diagnosis       !! AGE AND GENDER SPECIFIC ECG ANALYSIS !! Sinus bradycardia  Pulmonary disease pattern  Right bundle branch block  Left anterior fascicular block  !!! Bifascicular block !!! Abnormal ECG    Confirmed by ST SAHIL RUTLEDGE MD (), LIA AGUILAR ([id number]) on 11/9/2017 6:10:09 PM     URINE MICROSCOPIC    Collection Time: 11/09/17  7:28 PM   Result Value Ref Range    WBC 20-50 0 /hpf    RBC 0-3 0 /hpf    Epithelial cells 0 0 /hpf    Bacteria 4+ (H) 0 /hpf    Casts 3-5 0 /lpf   CULTURE, URINE    Collection Time: 11/09/17  7:28 PM   Result Value Ref Range    Special Requests: NO SPECIAL REQUESTS      Culture result:        NO GROWTH AFTER SHORT PERIOD OF INCUBATION. FURTHER RESULTS TO FOLLOW AFTER OVERNIGHT INCUBATION. EKG, 12 LEAD, INITIAL    Collection Time: 11/09/17  9:57 PM   Result Value Ref Range    Ventricular Rate 61 BPM    Atrial Rate 61 BPM    P-R Interval 196 ms    QRS Duration 116 ms    Q-T Interval 418 ms    QTC Calculation (Bezet) 420 ms    Calculated P Axis 92 degrees    Calculated R Axis -65 degrees    Calculated T Axis 13 degrees    Diagnosis       !! AGE AND GENDER SPECIFIC ECG ANALYSIS !! Sinus rhythm with sinus arrhythmia  Right bundle branch block  Left anterior fascicular block  !!! Bifascicular block !!!   Abnormal ECG  When compared with ECG of 09-NOV-2017 16:41,  No significant change was found HEMOGLOBIN A1C WITH EAG    Collection Time: 11/10/17  7:25 AM   Result Value Ref Range    Hemoglobin A1c 5.1 4.8 - 6.0 %    Est. average glucose 059 mg/dL   METABOLIC PANEL, BASIC    Collection Time: 11/10/17  7:25 AM   Result Value Ref Range    Sodium 140 136 - 145 mmol/L    Potassium 4.4 3.5 - 5.1 mmol/L    Chloride 107 98 - 107 mmol/L    CO2 22 21 - 32 mmol/L    Anion gap 11 7 - 16 mmol/L    Glucose 78 65 - 100 mg/dL    BUN 22 8 - 23 MG/DL    Creatinine 1.04 (H) 0.6 - 1.0 MG/DL    GFR est AA >60 >60 ml/min/1.73m2    GFR est non-AA 53 (L) >60 ml/min/1.73m2    Calcium 10.1 8.3 - 10.4 MG/DL   TSH 3RD GENERATION    Collection Time: 11/10/17  7:25 AM   Result Value Ref Range    TSH 1.380 0.358 - 3.740 uIU/mL   LIPID PANEL    Collection Time: 11/10/17  7:25 AM   Result Value Ref Range    LIPID PROFILE          Cholesterol, total 189 <200 MG/DL    Triglyceride 56 35 - 150 MG/DL    HDL Cholesterol 68 (H) 40 - 60 MG/DL    LDL, calculated 109.8 (H) <100 MG/DL    VLDL, calculated 11.2 6.0 - 23.0 MG/DL    CHOL/HDL Ratio 2.8           Assessment/Plan:     Principal Problem:    Ischemic stroke of frontal lobe (HCC) (11/10/2017)      Overview: Right side  Implement stroke order set. Change to aspirin 325 mg and start lipitor 80 mg x 6 weeks then adjust for LDL < 70. PT/OT/ST. Check echo and carotid US. . Active Problems:    UTI (urinary tract infection) (11/9/2017)  Rocephin day 2. Urine culture penidng. Weakness (11/9/2017)      Hypertensive emergency (11/9/2017)  Currently allowing higher spb but much improved from admission. TIA (transient ischemic attack) (11/9/2017)    Will need STR. SW consulted, PPD ordered.      Care Plan discussed with: Patient/Family and Other Therapist      Signed By: Anita Li MD     November 10, 2017

## 2017-11-10 NOTE — PROGRESS NOTES
Pt has been kept NPO, Stand screen not performed due to Dr. Oz Butler note to be kept NPO until assessed by

## 2017-11-10 NOTE — PROGRESS NOTES
Problem: Self Care Deficits Care Plan (Adult)  Goal: *Acute Goals and Plan of Care (Insert Text)  1. Brady Sanon will complete grooming with standby assistance within 7 visit(s). 2. Brady Sanon will complete upper body bathing and dressing while seated in chair with standby assistance within 7 visit(s). 3. Brady Sanon will require minimal assistance to maintain standing balance at midline x 5 minutes in prep for ADL within 7 visits. Rosa Rasmussen will demonstrate good attention to the left hemifield using with minimal cues within 5 visits. Rosa Rasmussen will complete lower body bathing and dressing with moderate assistance within 7 visits. 6. Brady Sanon will require minimal assist for bed to chair/BSC within 7 visits. OCCUPATIONAL THERAPY: Initial Assessment and Treatment Day: 1st 11/10/2017  INPATIENT: Hospital Day: 2  Payor: Janell Morelos / Plan: [address] [place] [address] HMO / Product Type: [employer] /      NAME/AGE/GENDER: Brady Sanon is a 80 y.o. female   PRIMARY DIAGNOSIS:  Weakness  UTI (urinary tract infection)  Hypertensive emergency  UTI (urinary tract infection)  Hypertensive emergency  UTI (urinary tract infection) Ischemic stroke of frontal lobe ([facility]) Ischemic stroke of frontal lobe ([facility] [address]        ICD-10: Treatment Diagnosis:    · Other lack of cordination (R27.8)  · History of falling (Z91.81)   Precautions/Allergies:     Review of patient's allergies indicates no known allergies. ASSESSMENT:     Ms. Anabela Weathers presents s/p acute R frontal lobe CVA and history of occipital/L parietal lobe infarcts per today's MRI. She leans L in standing requiring maximal facilitation/assistance to come to midline and demonstrates some difficulty following commands. She lives alone, but is fairly reliant on granddaughter and granddaughter's  Cheryl Raymundo present).   She currently requires maximal overall assistance for activities of daily living and functional mobility for activities of daily living. Svitlana King presents with the following problems and would benefit from occupational therapy to maximize independence with self-care,instrumental activities of daily living, and functional transfers/mobility for activities of daily living/instrumental activities of daily living via the stated goals. This section established at most recent assessment   PROBLEM LIST (Impairments causing functional limitations):  1. Decreased Strength  2. Decreased ADL/Functional Activities  3. Decreased Transfer Abilities  4. Decreased Balance  5. Decreased Activity Tolerance  6. Decreased Flexibility/Joint Mobility  7. Decreased Cognition   INTERVENTIONS PLANNED: (Benefits and precautions of occupational therapy have been discussed with the patient.)  1. Activities of daily living training  2. Cognitive training  3. Neuromuscular re-eduation  4. Therapeutic activity  5. Therapeutic exercise     TREATMENT PLAN: Frequency/Duration: Follow patient 3 times/week to address above goals. Rehabilitation Potential For Stated Goals: Good     RECOMMENDED REHABILITATION/EQUIPMENT: (at time of discharge pending progress): Due to the probability of continued deficits (see above) this patient will likely need continued skilled occupational therapy after discharge. Equipment:    None at this time              OCCUPATIONAL PROFILE AND HISTORY:   History of Present Injury/Illness (Reason for Referral):  Per MD H & P: Nohemi Lovelace is a 79 yo F with pmhx of CVA, cognitive decline, and hypertension, who presents to the ED with confusion and weakness. She is accompanied by her grandson-in-law, Mohinder Fabian.     She knows she is at [facility], but she cannot answer what year or month it is Henry Jones states she normally knows this). Due to confusion, history obtained from Fredo Keller.     He reports that he and his wife could not get Ms. Stephon Knapp on the phone this morning.   When they arrived to her house at ~11:30 AM, she could not get out of bed and walk. She normally ambulates very slowly with a rolling walker. He reports that she seemed confused and had difficulty talking. He also stated that she seemed to lean to the left. She was brought to the ED and found to have a UTI on urinalysis. She had a CT head that did not show any hemorrhage and showed evidence of previous CVA. She denies pain, shortness of breath, or chest pain. She reports she feels 'very sleepy.'       During discussion with Suzanne Pimentel and the patient, she started to have breathing where she appeared to be breath holding. BP was rechecked and was 227/113. BP had been some elevated earlier, but not this elevated.     Per Suzanne Pimentel, she lives in a rented mobile home and has been there for years. She does not leave the house unless they take her somewhere. Bozena Camacho, patient's granddaughter, and Suzanne Abler help her with shopping and assist her with tasks around the house. MRI Brain (11/10/17): IMPRESSION:     1. Acute to early subacute infarct in the right frontal lobe.     2. Old infarcts in the cerebellum and left parietal lobe.     3. Advanced cerebral volume loss with hippocampal atrophy and white matter  findings compatible with chronic small vessel ischemic disease. Past Medical History/Comorbidities:   Ms. Manish Ahuja  has a past medical history of Essential hypertension, benign (11/8/2013); Expressive aphasia (11/10/2013); GERD (gastroesophageal reflux disease); H/O appendicitis; History of appendicitis; History of bradycardia (11/8/2013); History of complete eye exam (04/2016); History of CVA (cerebrovascular accident) (11/8/2013); History of peptic ulcer (11/8/2013); Hyperlipidemia; Hypertension; Overactive bladder; Restless leg syndrome; Type II or unspecified type diabetes mellitus without mention of complication, not stated as uncontrolled (11/8/2013); and Wears dentures.   Ms. Manish Ahuja  has a past surgical history that includes appendectomy and abdominal wall defect repair. Social History/Living Environment:   Home Environment: Trailer/mobile home  # Steps to Enter: 5  One/Two Story Residence: One story  Living Alone: Yes  Support Systems: Child(rahul), Family member(s)  Patient Expects to be Discharged to[de-identified] Rehabilitation facility  Current DME Used/Available at Home: Olivier Verdugo, [address] [employer] [address] chair  Prior Level of Function/Work/Activity:  Lives alone and typically requires assistance with bathing/dressing/meal prep. Uses a standard walker and reports falls without injury with BLE crossing per granddaughter's  Verdie Arrow. Patient states she manages her own medications (only a few per Verdie Arrow). Heats up her meals as needed and wears a life alert. Previous Treatment Approaches:          Home health therapy. Number of Personal Factors/Comorbidities that affect the Plan of Care: Expanded review of therapy/medical records (1-2):  MODERATE COMPLEXITY   ASSESSMENT OF OCCUPATIONAL PERFORMANCE[de-identified]   Activities of Daily Living:           Basic ADLs (From Assessment) Complex ADLs (From Assessment)   Basic ADL  Feeding: Moderate assistance  Oral Facial Hygiene/Grooming: Moderate assistance  Bathing: Total assistance  Upper Body Dressing: Maximum assistance  Lower Body Dressing: Total assistance  Toileting: Maximum assistance Instrumental ADL  Meal Preparation: Total assistance  Homemaking: Total assistance   Grooming/Bathing/Dressing Activities of Daily Living     Cognitive Retraining  Safety/Judgement: Fall prevention                       Bed/Mat Mobility  Rolling: Moderate assistance  Supine to Sit: Moderate assistance  Sit to Stand: Minimum assistance (bedside chair)  Bed to Chair: Moderate assistance;Maximum assistance  Scooting:  Moderate assistance (forward in chair)       Most Recent Physical Functioning:   Gross Assessment:  AROM: Generally decreased, functional  Strength: Generally decreased, functional               Posture:  Posture (WDL): Exceptions to WDL  Posture Assessment: Forward head, Rounded shoulders, Trunk flexion  Balance:  Sitting: Impaired  Sitting - Static: Fair (occasional)  Sitting - Dynamic: Fair (occasional)  Standing: Impaired  Standing - Static: Poor  Standing - Dynamic : Poor Bed Mobility:  Rolling: Moderate assistance  Supine to Sit: Moderate assistance  Scooting: Moderate assistance (forward in chair)  Wheelchair Mobility:     Transfers:  Sit to Stand: Minimum assistance (bedside chair)  Stand to Sit: Minimum assistance  Bed to Chair: Moderate assistance;Maximum assistance                Patient Vitals for the past 6 hrs:   BP SpO2 Pulse   11/10/17 1126 139/81 93 % 66     Vision: Tracks object in all quadrants; L hemifield inattention noted. Able to read clock accurately with verbal/visual cues. Mental Status  Neurologic State: Alert  Orientation Level: Oriented to person, Oriented to place, Oriented to situation  Cognition: Follows commands  Perception: Appears intact  Perseveration: No perseveration noted  Safety/Judgement: Fall prevention                          Physical Skills Involved:  1. Range of Motion  2. Balance  3. Strength  4. Activity Tolerance  5. Gross Motor Control Cognitive Skills Affected (resulting in the inability to perform in a timely and safe manner):  1. Perception  2. Executive Function  3. Immediate Memory  4. Short Term Recall  5. Long Term Memory  6. Sustained Attention  7. Divided Attention Psychosocial Skills Affected:  1. Habits/Routines  2. Environmental Adaptation  3. Social Interaction  4. Self-Awareness  5. Awareness of Others  6. Social Roles   Number of elements that affect the Plan of Care: 3-5:  MODERATE COMPLEXITY   CLINICAL DECISION MAKIN [facility] [address] [id number] AM-PAC 6 Clicks   Daily Activity Inpatient Short Form  How much help from another person does the patient currently need. .. Total A Lot A Little None   1. Putting on and taking off regular lower body clothing? [x] 1   [] 2   [] 3   [] 4   2.   Bathing (including washing, rinsing, drying)? [x] 1   [] 2   [] 3   [] 4   3. Toileting, which includes using toilet, bedpan or urinal?   [x] 1   [] 2   [] 3   [] 4   4. Putting on and taking off regular upper body clothing? [] 1   [x] 2   [] 3   [] 4   5. Taking care of personal grooming such as brushing teeth? [] 1   [x] 2   [] 3   [] 4   6. Eating meals? [] 1   [x] 2   [] 3   [] 4   © 2007, Trustees of [facility], under license to [employer]. All rights reserved      Score:  Initial: 9 Most Recent: X (Date: -- )    Interpretation of Tool:  Represents activities that are increasingly more difficult (i.e. Bed mobility, Transfers, Gait). Score 24 23 22-20 19-15 14-10 9-7 6     Modifier CH CI CJ CK CL CM CN      ? Self Care:     - CURRENT STATUS: CM - 80%-99% impaired, limited or restricted    - GOAL STATUS: CK - 40%-59% impaired, limited or restricted    - D/C STATUS:  ---------------To be determined---------------  Payor: Pedrito Ibarra / Plan: [address] [place] [address] HMO / Product Type: Managed Care Medicare /      Medical Necessity:     · Patient demonstrates good rehab potential due to higher previous functional level. Reason for Services/Other Comments:  · Patient continues to require skilled intervention due to decreased independence with ADL, instrumental ADL, and functional mobility for ADL. Use of outcome tool(s) and clinical judgement create a POC that gives a: HIGH COMPLEXITY         TREATMENT:   (In addition to Assessment/Re-Assessment sessions the following treatments were rendered)     Pre-treatment Symptoms/Complaints:    Pain: Initial:   Pain Intensity 1:  (no complaints of pain)  Post Session:  same    In addition to assessment, below treatment medically necessary:  Neuromuscular Re-education: (10 minutes):  Exercise/activities per grid below to improve balance, coordination, kinesthetic sense, posture and proprioception.   Required maximal visual, verbal, manual and tactile cues to promote static and dynamic balance in standing and promote coordination of bilateral, upper extremity(s), trunk. Re-Education Training  Re-Education Training: Yes    Trunk Re-Education  Muscle Facilitation: Trunk flexion/extension in sitting and standing with maximal tactile/verbal/visual cueing. Other Activities: Functional reaching forward to promote trunk flexion 3-5 reps. Braces/Orthotics/Lines/Etc:   · IV  · O2 Device: Room air  Treatment/Session Assessment:    Response to Treatment:  Tolerated treatment well without complications. · Interdisciplinary Collaboration:   o Occupational Therapist  o Registered Nurse  · After treatment position/precautions:   o Up in chair  o Bed/Chair-wheels locked  o Call light within reach  o Family at bedside   · Compliance with Program/Exercises: Will assess as treatment progresses. · Recommendations/Intent for next treatment session: \"Next visit will focus on advancements to more challenging activities\".   Total Treatment Duration:  OT Patient Time In/Time Out  Time In: 1308  Time Out: Loren [address]. KAMLESH Oliveira, OTR/L

## 2017-11-10 NOTE — ED PROVIDER NOTES
HPI Comments: 80-year-old lady with a history of fatigue and weakness that started earlier today. Family also noted that the patient may have had some facial droop and arm weakness. They said that they spoke to her last night but did not physically see her last night and when they went to see her today she seemed like she little altered and confused. He said that she hasn't had any nausea or vomiting and has had no fevers or chills. She denies any headache. Overall the patient himself just says she feels tired. Elements of this note were created using speech recognition software. As such, errors of speech recognition may be present. Patient is a 80 y.o. female presenting with fatigue. The history is provided by the patient and a relative. Fatigue   Primary symptoms include speech difficulty. Pertinent negatives include no shortness of breath, no chest pain, no vomiting, no confusion, no headaches and no nausea.         Past Medical History:   Diagnosis Date    Essential hypertension, benign 11/8/2013    Expressive aphasia 11/10/2013    GERD (gastroesophageal reflux disease)     H/O appendicitis     History of appendicitis     History of bradycardia 11/8/2013    History of complete eye exam 04/2016    History of CVA (cerebrovascular accident) 11/8/2013    History of peptic ulcer 11/8/2013    Hyperlipidemia     Hypertension     Overactive bladder     Restless leg syndrome     Type II or unspecified type diabetes mellitus without mention of complication, not stated as uncontrolled 11/8/2013    Wears dentures        Past Surgical History:   Procedure Laterality Date    HX ABDOMINAL WALL DEFECT REPAIR      peptic ulcer    HX APPENDECTOMY           Family History:   Problem Relation Age of Onset    Colon Cancer Mother     Stroke Father        Social History     Social History    Marital status:      Spouse name: N/A    Number of children: N/A    Years of education: N/A Occupational History    Not on file. Social History Main Topics    Smoking status: Former Smoker     Packs/day: 0.25     Years: 20.00     Quit date: 12/31/2000    Smokeless tobacco: Never Used    Alcohol use No    Drug use: Not on file    Sexual activity: Not on file     Other Topics Concern    Not on file     Social History Narrative         ALLERGIES: Review of patient's allergies indicates no known allergies. Review of Systems   Constitutional: Positive for activity change, appetite change and fatigue. Negative for chills, diaphoresis and fever. HENT: Negative for congestion, rhinorrhea and sore throat. Eyes: Negative for redness and visual disturbance. Respiratory: Negative for cough, chest tightness, shortness of breath and wheezing. Cardiovascular: Negative for chest pain and palpitations. Gastrointestinal: Negative for abdominal pain, blood in stool, diarrhea, nausea and vomiting. Endocrine: Negative for polydipsia and polyuria. Genitourinary: Negative for dysuria and hematuria. Musculoskeletal: Negative for arthralgias, myalgias and neck stiffness. Skin: Negative for rash. Allergic/Immunologic: Negative for environmental allergies and food allergies. Neurological: Positive for speech difficulty. Negative for dizziness, weakness and headaches. Hematological: Negative for adenopathy. Does not bruise/bleed easily. Psychiatric/Behavioral: Negative for confusion and sleep disturbance. The patient is not nervous/anxious. Vitals:    11/09/17 1803 11/09/17 1910 11/09/17 1925 11/09/17 1942   BP:  (!) 198/113 (!) 205/93 (!) 189/117   Pulse:  (!) 54 (!) 56 (!) 52   Resp: 24      Temp:       SpO2:  98% 97% 96%   Weight:       Height:                Physical Exam   Constitutional: She appears well-developed and well-nourished. HENT:   Head: Normocephalic and atraumatic. Eyes: Conjunctivae and EOM are normal. Pupils are equal, round, and reactive to light.    Neck: Normal range of motion. Cardiovascular: Normal rate and regular rhythm. Pulmonary/Chest: Effort normal and breath sounds normal. No respiratory distress. She has no wheezes. She has no rales. She exhibits no tenderness. Abdominal: Soft. Bowel sounds are normal. There is no rebound and no guarding. Musculoskeletal: Normal range of motion. She exhibits no edema or tenderness. Lymphadenopathy:     She has no cervical adenopathy. Neurological: She is alert. Patient had some minor right arm drift but no obvious speech problems    She was confused about her setting and the time   Skin: Skin is warm and dry. Psychiatric: She has a normal mood and affect. Nursing note and vitals reviewed. MDM  Number of Diagnoses or Management Options  Diagnosis management comments: Patient's urine is positive for a likely UTI. I will send it for culture. I spoke with the hospitalist who kindly agreed to see the patient.     ED Course       Procedures

## 2017-11-11 LAB
ANION GAP SERPL CALC-SCNC: 10 MMOL/L (ref 7–16)
BASOPHILS # BLD: 0 K/UL (ref 0–0.2)
BASOPHILS NFR BLD: 0 % (ref 0–2)
BUN SERPL-MCNC: 30 MG/DL (ref 8–23)
CALCIUM SERPL-MCNC: 6.9 MG/DL (ref 8.3–10.4)
CHLORIDE SERPL-SCNC: 109 MMOL/L (ref 98–107)
CO2 SERPL-SCNC: 23 MMOL/L (ref 21–32)
CREAT SERPL-MCNC: 1 MG/DL (ref 0.6–1)
DIFFERENTIAL METHOD BLD: ABNORMAL
EOSINOPHIL # BLD: 0 K/UL (ref 0–0.8)
EOSINOPHIL NFR BLD: 1 % (ref 0.5–7.8)
ERYTHROCYTE [DISTWIDTH] IN BLOOD BY AUTOMATED COUNT: 14.7 % (ref 11.9–14.6)
EST. AVERAGE GLUCOSE BLD GHB EST-MCNC: 97 MG/DL
GLUCOSE BLD STRIP.AUTO-MCNC: 92 MG/DL (ref 65–100)
GLUCOSE SERPL-MCNC: 101 MG/DL (ref 65–100)
HBA1C MFR BLD: 5 % (ref 4.8–6)
HCT VFR BLD AUTO: 36.9 % (ref 35.8–46.3)
HGB BLD-MCNC: 12.4 G/DL (ref 11.7–15.4)
IMM GRANULOCYTES # BLD: 0 K/UL (ref 0–0.5)
IMM GRANULOCYTES NFR BLD AUTO: 0 % (ref 0–5)
LYMPHOCYTES # BLD: 1.4 K/UL (ref 0.5–4.6)
LYMPHOCYTES NFR BLD: 22 % (ref 13–44)
MAGNESIUM SERPL-MCNC: 2 MG/DL (ref 1.8–2.4)
MCH RBC QN AUTO: 29.8 PG (ref 26.1–32.9)
MCHC RBC AUTO-ENTMCNC: 33.6 G/DL (ref 31.4–35)
MCV RBC AUTO: 88.7 FL (ref 79.6–97.8)
MONOCYTES # BLD: 0.5 K/UL (ref 0.1–1.3)
MONOCYTES NFR BLD: 8 % (ref 4–12)
NEUTS SEG # BLD: 4.6 K/UL (ref 1.7–8.2)
NEUTS SEG NFR BLD: 69 % (ref 43–78)
PHOSPHATE SERPL-MCNC: 2.8 MG/DL (ref 2.3–3.7)
PLATELET # BLD AUTO: 233 K/UL (ref 150–450)
PMV BLD AUTO: 10.8 FL (ref 10.8–14.1)
POTASSIUM SERPL-SCNC: 4.1 MMOL/L (ref 3.5–5.1)
RBC # BLD AUTO: 4.16 M/UL (ref 4.05–5.25)
SODIUM SERPL-SCNC: 142 MMOL/L (ref 136–145)
WBC # BLD AUTO: 6.6 K/UL (ref 4.3–11.1)

## 2017-11-11 PROCEDURE — 74011250636 HC RX REV CODE- 250/636: Performed by: INTERNAL MEDICINE

## 2017-11-11 PROCEDURE — 65660000000 HC RM CCU STEPDOWN

## 2017-11-11 PROCEDURE — 85025 COMPLETE CBC W/AUTO DIFF WBC: CPT | Performed by: INTERNAL MEDICINE

## 2017-11-11 PROCEDURE — 83735 ASSAY OF MAGNESIUM: CPT | Performed by: INTERNAL MEDICINE

## 2017-11-11 PROCEDURE — 74011000258 HC RX REV CODE- 258: Performed by: INTERNAL MEDICINE

## 2017-11-11 PROCEDURE — 83036 HEMOGLOBIN GLYCOSYLATED A1C: CPT | Performed by: INTERNAL MEDICINE

## 2017-11-11 PROCEDURE — 36415 COLL VENOUS BLD VENIPUNCTURE: CPT | Performed by: INTERNAL MEDICINE

## 2017-11-11 PROCEDURE — 80048 BASIC METABOLIC PNL TOTAL CA: CPT | Performed by: INTERNAL MEDICINE

## 2017-11-11 PROCEDURE — 84100 ASSAY OF PHOSPHORUS: CPT | Performed by: INTERNAL MEDICINE

## 2017-11-11 PROCEDURE — 74011250637 HC RX REV CODE- 250/637: Performed by: INTERNAL MEDICINE

## 2017-11-11 PROCEDURE — 82962 GLUCOSE BLOOD TEST: CPT

## 2017-11-11 RX ORDER — TRAMADOL HYDROCHLORIDE 50 MG/1
50 TABLET ORAL
Status: DISCONTINUED | OUTPATIENT
Start: 2017-11-11 | End: 2017-11-14 | Stop reason: HOSPADM

## 2017-11-11 RX ADMIN — DONEPEZIL HYDROCHLORIDE 5 MG: 5 TABLET, FILM COATED ORAL at 23:00

## 2017-11-11 RX ADMIN — Medication 10 ML: at 05:59

## 2017-11-11 RX ADMIN — VALSARTAN 160 MG: 160 TABLET, FILM COATED ORAL at 08:05

## 2017-11-11 RX ADMIN — Medication 5 ML: at 16:04

## 2017-11-11 RX ADMIN — HEPARIN SODIUM 5000 UNITS: 5000 INJECTION, SOLUTION INTRAVENOUS; SUBCUTANEOUS at 16:04

## 2017-11-11 RX ADMIN — ATORVASTATIN CALCIUM 80 MG: 40 TABLET, FILM COATED ORAL at 23:00

## 2017-11-11 RX ADMIN — Medication 5 ML: at 16:03

## 2017-11-11 RX ADMIN — TRAMADOL HYDROCHLORIDE 50 MG: 50 TABLET, FILM COATED ORAL at 17:02

## 2017-11-11 RX ADMIN — ROPINIROLE HYDROCHLORIDE 0.5 MG: 0.5 TABLET, FILM COATED ORAL at 22:00

## 2017-11-11 RX ADMIN — CEFTRIAXONE SODIUM 1 G: 1 INJECTION, POWDER, FOR SOLUTION INTRAMUSCULAR; INTRAVENOUS at 22:59

## 2017-11-11 RX ADMIN — Medication 10 ML: at 23:00

## 2017-11-11 RX ADMIN — HEPARIN SODIUM 5000 UNITS: 5000 INJECTION, SOLUTION INTRAVENOUS; SUBCUTANEOUS at 05:59

## 2017-11-11 RX ADMIN — HEPARIN SODIUM 5000 UNITS: 5000 INJECTION, SOLUTION INTRAVENOUS; SUBCUTANEOUS at 22:59

## 2017-11-11 RX ADMIN — ROPINIROLE HYDROCHLORIDE 0.5 MG: 0.5 TABLET, FILM COATED ORAL at 00:31

## 2017-11-11 RX ADMIN — Medication 10 ML: at 23:06

## 2017-11-11 RX ADMIN — ASPIRIN 325 MG ORAL TABLET 325 MG: 325 PILL ORAL at 08:05

## 2017-11-11 NOTE — PROGRESS NOTES
END OF SHIFT NOTE:    Intake/Output      Voiding: yes  Catheter: no  Drain:              Stool:  0 occurrences. Emesis:  0 occurrences. VITAL SIGNS  Patient Vitals for the past 12 hrs:   Temp Pulse Resp BP SpO2   11/11/17 0345 97.7 °F (36.5 °C) 66 16 152/71 97 %   11/10/17 2318 98 °F (36.7 °C) 71 16 162/54 98 %   11/10/17 1947 98.5 °F (36.9 °C) 86 16 149/78 94 %       Pain Assessment  Pain 1  Pain Scale 1: Visual (11/11/17 0401)  Pain Intensity 1: 0 (11/11/17 0401)  Patient Stated Pain Goal: 0 (11/10/17 1930)  Pain Reassessment 1: Patient sleeping (11/11/17 0401)    Ambulating  no    Additional Information: Pt slept well through the night. No issues voiced. No SOB or pain reported through the night. Shift report given to oncoming nurse at the bedside.     Aline Bowers RN

## 2017-11-11 NOTE — PROGRESS NOTES
Assessment completed. Pt laying in bed. Oriented to self and place. Denies any pain. States she is \"sleepy but did rest well last night\". Son at bedside. New IV placed and NS running @ 75 mL/hr. Trace edema to BLE. Bed locked, in low position, call light in reach. Will monitor.

## 2017-11-11 NOTE — PROGRESS NOTES
Pt is c/o severe headache, 8/10. /63. States Tylenol did not help and would like something stronger. Dr. Haider Black paged and given verbal orders for Ultram 50 mg q 6 hrs PRN.

## 2017-11-11 NOTE — PROGRESS NOTES
Visit with patient to build rapport with . Patient is calm. Receptive to  presence. Encouraged and assured patient of our continued care. Provided extra blanket as requested. James.     Eyal Weathers,  Staff   C: [phone]  /  [email or web address]

## 2017-11-11 NOTE — PROGRESS NOTES
Hospitalist Progress Note    Subjective:   Daily Progress Note: 11/11/2017 12:11 PM    Ms. Lucian Stein is a 81 yo white female, with a pmh of remote CVAs, who presented 11/9 for increased confusion/forgetfulness, weakness, and a lean to the left. UA indicative of UTI and she is on rocephin day 3, culture with >100K GNRs. CT head negative for acute abnormality in the ED but MRI Brain reveals acute right frontal ischemic CVA. Takes aspirin 81 mg at home. Also initially with hypertensive emergency that required cardene gtt in the ED but she was weaned off prior to moving upstairs. now on full dose aspirin and statin. Echo with ef of 55-60% and does reveal a shunt.  and A1C is 5. Carotid US with no significant findings. States she is cold but otherwise denies complaints. Current Facility-Administered Medications   Medication Dose Route Frequency    donepezil (ARICEPT) tablet 5 mg  5 mg Oral QHS    rOPINIRole (REQUIP) tablet 0.5 mg  0.5 mg Oral QHS    valsartan (DIOVAN) tablet 160 mg  160 mg Oral DAILY    hydrALAZINE (APRESOLINE) 20 mg/mL injection 10 mg  10 mg IntraVENous Q6H PRN    cefTRIAXone (ROCEPHIN) 1 g in 0.9% sodium chloride (MBP/ADV) 50 mL  1 g IntraVENous Q24H    sodium chloride (NS) flush 5-10 mL  5-10 mL IntraVENous Q8H    sodium chloride (NS) flush 5-10 mL  5-10 mL IntraVENous PRN    aspirin (ASPIRIN) tablet 325 mg  325 mg Oral DAILY    atorvastatin (LIPITOR) tablet 80 mg  80 mg Oral QHS    heparin (porcine) injection 5,000 Units  5,000 Units SubCUTAneous Q8H    sodium chloride (NS) flush 5-10 mL  5-10 mL IntraVENous Q8H    sodium chloride (NS) flush 5-10 mL  5-10 mL IntraVENous PRN    acetaminophen (TYLENOL) tablet 650 mg  650 mg Oral Q4H PRN        Review of Systems  A comprehensive review of systems was negative except for that written in the HPI.     Objective:     Visit Vitals    /81    Pulse 64    Temp 98.2 °F (36.8 °C)    Resp 16    Ht 5' 5\" (1.651 m)    Wt 54.1 kg (119 lb 3.2 oz)    SpO2 95%    Breastfeeding No    BMI 19.84 kg/m2      O2 Device: Room air    Temp (24hrs), Av.1 °F (36.7 °C), Min:97.7 °F (36.5 °C), Max:98.5 °F (36.9 °C)              General: awake, alert, oriented to person and place, no acute distress  Eyes; non icteric, EOMI  Neck; supple  Cv: RRR  Pulm; CTAb  Neuro: moves all extremities but doesn't follow all commands, no facial droop, normal speech    Additional comments:I reviewed the patient's new clinical lab test results. j    Data Review    Recent Results (from the past 24 hour(s))   GLUCOSE, POC    Collection Time: 11/10/17  8:18 PM   Result Value Ref Range    Glucose (POC) 117 (H) 65 - 100 mg/dL   GLUCOSE, POC    Collection Time: 17  5:40 AM   Result Value Ref Range    Glucose (POC) 92 65 - 229 mg/dL   METABOLIC PANEL, BASIC    Collection Time: 17  5:56 AM   Result Value Ref Range    Sodium 142 136 - 145 mmol/L    Potassium 4.1 3.5 - 5.1 mmol/L    Chloride 109 (H) 98 - 107 mmol/L    CO2 23 21 - 32 mmol/L    Anion gap 10 7 - 16 mmol/L    Glucose 101 (H) 65 - 100 mg/dL    BUN 30 (H) 8 - 23 MG/DL    Creatinine 1.00 0.6 - 1.0 MG/DL    GFR est AA >60 >60 ml/min/1.73m2    GFR est non-AA 55 (L) >60 ml/min/1.73m2    Calcium 6.9 (L) 8.3 - 10.4 MG/DL   CBC WITH AUTOMATED DIFF    Collection Time: 17  5:56 AM   Result Value Ref Range    WBC 6.6 4.3 - 11.1 K/uL    RBC 4.16 4.05 - 5.25 M/uL    HGB 12.4 11.7 - 15.4 g/dL    HCT 36.9 35.8 - 46.3 %    MCV 88.7 79.6 - 97.8 FL    MCH 29.8 26.1 - 32.9 PG    MCHC 33.6 31.4 - 35.0 g/dL    RDW 14.7 (H) 11.9 - 14.6 %    PLATELET 378 762 - 325 K/uL    MPV 10.8 10.8 - 14.1 FL    DF AUTOMATED      NEUTROPHILS 69 43 - 78 %    LYMPHOCYTES 22 13 - 44 %    MONOCYTES 8 4.0 - 12.0 %    EOSINOPHILS 1 0.5 - 7.8 %    BASOPHILS 0 0.0 - 2.0 %    IMMATURE GRANULOCYTES 0 0.0 - 5.0 %    ABS. NEUTROPHILS 4.6 1.7 - 8.2 K/UL    ABS. LYMPHOCYTES 1.4 0.5 - 4.6 K/UL    ABS. MONOCYTES 0.5 0.1 - 1.3 K/UL    ABS.  EOSINOPHILS 0.0 0.0 - 0.8 K/UL    ABS. BASOPHILS 0.0 0.0 - 0.2 K/UL    ABS. IMM. GRANS. 0.0 0.0 - 0.5 K/UL   MAGNESIUM    Collection Time: 11/11/17  5:56 AM   Result Value Ref Range    Magnesium 2.0 1.8 - 2.4 mg/dL   PHOSPHORUS    Collection Time: 11/11/17  5:56 AM   Result Value Ref Range    Phosphorus 2.8 2.3 - 3.7 MG/DL   HEMOGLOBIN A1C WITH EAG    Collection Time: 11/11/17  5:56 AM   Result Value Ref Range    Hemoglobin A1c 5.0 4.8 - 6.0 %    Est. average glucose 97 mg/dL         Assessment/Plan:     Principal Problem:    Ischemic stroke of frontal lobe ([facility], [place] [address] (11/10/2017)      Overview: Right side    Active Problems:    UTI (urinary tract infection) (11/9/2017)      Weakness (11/9/2017)      Hypertensive emergency (11/9/2017)      TIA (transient ischemic attack) (11/9/2017)    continue statin, full dose aspirin and therapies. Rocephin day 3 for UTI. Await cultures. Stable for dc to STR bed once insurance precert obtained.      Care Plan discussed with: Patient/Family    Signed By: Leona Sheffield MD     November 11, 2017

## 2017-11-12 LAB
ANION GAP SERPL CALC-SCNC: 8 MMOL/L (ref 7–16)
ATRIAL RATE: 90 BPM
BUN SERPL-MCNC: 30 MG/DL (ref 8–23)
CALCIUM SERPL-MCNC: 10.2 MG/DL (ref 8.3–10.4)
CALCULATED P AXIS, ECG09: 58 DEGREES
CALCULATED R AXIS, ECG10: -64 DEGREES
CALCULATED T AXIS, ECG11: 2 DEGREES
CHLORIDE SERPL-SCNC: 110 MMOL/L (ref 98–107)
CO2 SERPL-SCNC: 24 MMOL/L (ref 21–32)
CREAT SERPL-MCNC: 0.95 MG/DL (ref 0.6–1)
DIAGNOSIS, 93000: NORMAL
GLUCOSE SERPL-MCNC: 119 MG/DL (ref 65–100)
P-R INTERVAL, ECG05: 234 MS
POTASSIUM SERPL-SCNC: 4.1 MMOL/L (ref 3.5–5.1)
Q-T INTERVAL, ECG07: 384 MS
QRS DURATION, ECG06: 124 MS
QTC CALCULATION (BEZET), ECG08: 469 MS
SODIUM SERPL-SCNC: 142 MMOL/L (ref 136–145)
VENTRICULAR RATE, ECG03: 90 BPM

## 2017-11-12 PROCEDURE — 65660000000 HC RM CCU STEPDOWN

## 2017-11-12 PROCEDURE — 36415 COLL VENOUS BLD VENIPUNCTURE: CPT | Performed by: INTERNAL MEDICINE

## 2017-11-12 PROCEDURE — 80048 BASIC METABOLIC PNL TOTAL CA: CPT | Performed by: INTERNAL MEDICINE

## 2017-11-12 PROCEDURE — 74011250637 HC RX REV CODE- 250/637: Performed by: INTERNAL MEDICINE

## 2017-11-12 PROCEDURE — 74011250636 HC RX REV CODE- 250/636: Performed by: INTERNAL MEDICINE

## 2017-11-12 PROCEDURE — 74011000258 HC RX REV CODE- 258: Performed by: INTERNAL MEDICINE

## 2017-11-12 PROCEDURE — 93005 ELECTROCARDIOGRAM TRACING: CPT | Performed by: INTERNAL MEDICINE

## 2017-11-12 RX ADMIN — ASPIRIN 325 MG ORAL TABLET 325 MG: 325 PILL ORAL at 08:20

## 2017-11-12 RX ADMIN — HEPARIN SODIUM 5000 UNITS: 5000 INJECTION, SOLUTION INTRAVENOUS; SUBCUTANEOUS at 14:09

## 2017-11-12 RX ADMIN — Medication 10 ML: at 16:34

## 2017-11-12 RX ADMIN — Medication 10 ML: at 05:55

## 2017-11-12 RX ADMIN — VALSARTAN 160 MG: 160 TABLET, FILM COATED ORAL at 08:20

## 2017-11-12 RX ADMIN — HYDRALAZINE HYDROCHLORIDE 10 MG: 20 INJECTION INTRAMUSCULAR; INTRAVENOUS at 00:14

## 2017-11-12 RX ADMIN — HEPARIN SODIUM 5000 UNITS: 5000 INJECTION, SOLUTION INTRAVENOUS; SUBCUTANEOUS at 21:34

## 2017-11-12 RX ADMIN — CEFTRIAXONE SODIUM 1 G: 1 INJECTION, POWDER, FOR SOLUTION INTRAMUSCULAR; INTRAVENOUS at 21:33

## 2017-11-12 RX ADMIN — ATORVASTATIN CALCIUM 80 MG: 40 TABLET, FILM COATED ORAL at 21:33

## 2017-11-12 RX ADMIN — Medication 5 ML: at 21:53

## 2017-11-12 RX ADMIN — Medication 5 ML: at 21:45

## 2017-11-12 RX ADMIN — HYDRALAZINE HYDROCHLORIDE 10 MG: 20 INJECTION INTRAMUSCULAR; INTRAVENOUS at 21:34

## 2017-11-12 RX ADMIN — HEPARIN SODIUM 5000 UNITS: 5000 INJECTION, SOLUTION INTRAVENOUS; SUBCUTANEOUS at 05:55

## 2017-11-12 RX ADMIN — DONEPEZIL HYDROCHLORIDE 5 MG: 5 TABLET, FILM COATED ORAL at 21:33

## 2017-11-12 RX ADMIN — ROPINIROLE HYDROCHLORIDE 0.5 MG: 0.5 TABLET, FILM COATED ORAL at 21:45

## 2017-11-12 NOTE — PROGRESS NOTES
OCCUPATIONAL THERAPY NOTE: Held treatment this AM secondary RN/family request.  May try back tomorrow as schedule permits.

## 2017-11-12 NOTE — PROGRESS NOTES
Assessment completed. Pt sleeping. RR even and unlabored. No s/s of distress. ON remote tele running NSR with 1st degree block @ 81. Fluids dc. Bed locked, in low position, call light in reach. Will monitor.

## 2017-11-12 NOTE — PROGRESS NOTES
Pt is alert and oriented to place, name, and situation. Pt appears weak and states she \"just wants to sleep\". Pt has purposeful movement to all extremities except right lower leg.

## 2017-11-12 NOTE — PROGRESS NOTES
Progress Note    Patient: Troy Reed MRN: [id number]  SSN: [id number]    YOB: 1924  Age: 80 y.o. Sex: female      Admit Date: 11/9/2017    LOS: 3 days     Subjective:     Pt says she is feeling better this morning. She is enjoying her breakfast and has no complaints. Objective:     Vitals:    11/12/17 0013 11/12/17 0326 11/12/17 0447 11/12/17 0744   BP: 184/84 121/62  123/67   Pulse: 67 91  83   Resp:  20  18   Temp:  98.2 °F (36.8 °C)  97.7 °F (36.5 °C)   SpO2:  93%  96%   Weight:   55.3 kg (122 lb)    Height:              Physical Exam:   General: elderly  female sitting up in bed eating breakfast, oriented to person and place, month but not year  HEENT: NCAT, EOMI, OOP pink/moist   CV: RRR, no m/g/r   Lungs - CTAB, no wheezes/crackles   Abd - soft, NT, ND   Ext - no edema   Neuro - CN II-XII intact, no focal deficits. Motor 4/5 in all extremities. Lab/Data Review:  Recent Results (from the past 24 hour(s))   EKG, 12 LEAD, SUBSEQUENT    Collection Time: 11/12/17  4:11 AM   Result Value Ref Range    Ventricular Rate 90 BPM    Atrial Rate 90 BPM    P-R Interval 234 ms    QRS Duration 124 ms    Q-T Interval 384 ms    QTC Calculation (Bezet) 469 ms    Calculated P Axis 58 degrees    Calculated R Axis -64 degrees    Calculated T Axis 2 degrees    Diagnosis       Sinus rhythm with 1st degree A-V block  Right bundle branch block  Left anterior fascicular block  !!! Bifascicular block !!!   Abnormal ECG    Confirmed by Matilde Birmingham ([id number]) on 11/12/2017 6:65:21 AM     METABOLIC PANEL, BASIC    Collection Time: 11/12/17  5:46 AM   Result Value Ref Range    Sodium 142 136 - 145 mmol/L    Potassium 4.1 3.5 - 5.1 mmol/L    Chloride 110 (H) 98 - 107 mmol/L    CO2 24 21 - 32 mmol/L    Anion gap 8 7 - 16 mmol/L    Glucose 119 (H) 65 - 100 mg/dL    BUN 30 (H) 8 - 23 MG/DL    Creatinine 0.95 0.6 - 1.0 MG/DL    GFR est AA >60 >60 ml/min/1.73m2    GFR est non-AA 58 (L) >60 ml/min/1.73m2 Calcium 10.2 8.3 - 10.4 MG/DL       Assessment:   Ms. Alfred Sadler is a 80 y.o. female with PMH of CVA and HTN admitted for confusion and weakness and found to have R frontal CVA as well as UTI. Plan:   1. R frontal CVA - Confusion resolved. Continue ASA 325mg (was on 82mg previously at home), PT, OT. Decrease Lipitor dose to 40mg for better tolerance. .   2. UTI - GNR in UCx. Awaiting speciation. Day 4 of Rocephin. 3. Dementia - Aricept. 4. HTN - Valsartan. 5. DVT ppx - heparin SQ    DNR  Dispo - Potential d/c tomorrow to STR.     Signed By: Danyelle Smalls MD     November 12, 2017

## 2017-11-12 NOTE — PROGRESS NOTES
Nurse asking to hold PT if possible secondary to pt not feeling well. Will attempt again at later date if pt is feeling up to therapy.     Glenna Crouch DPT  11/12/2017

## 2017-11-12 NOTE — PROGRESS NOTES
Monitor tech reports pt was previously in sinus rhythm, but presently in sinus rhythm with 1st degree heart block, and with more PVCs and PAC than usual.  Notified Dr. Tania Szymanski. Telephone order received for stat EKG, and call him back with results. RRT notified.

## 2017-11-12 NOTE — PROGRESS NOTES
Pt has been very sleepy today. Arouses easily and is oriented to self and place. States she \"is just really tired\". Ate 1/2 of her lunch/dinner. Denies any pain. Aware to call for assistance when needed. Bed locked, in low position, call light in reach. Will monitor.

## 2017-11-13 LAB
BACTERIA SPEC CULT: ABNORMAL
SERVICE CMNT-IMP: ABNORMAL

## 2017-11-13 PROCEDURE — 92526 ORAL FUNCTION THERAPY: CPT

## 2017-11-13 PROCEDURE — 97530 THERAPEUTIC ACTIVITIES: CPT

## 2017-11-13 PROCEDURE — 74011250636 HC RX REV CODE- 250/636: Performed by: INTERNAL MEDICINE

## 2017-11-13 PROCEDURE — 65660000000 HC RM CCU STEPDOWN

## 2017-11-13 PROCEDURE — 74011250637 HC RX REV CODE- 250/637: Performed by: INTERNAL MEDICINE

## 2017-11-13 RX ORDER — CEPHALEXIN 500 MG/1
500 CAPSULE ORAL EVERY 8 HOURS
Status: DISCONTINUED | OUTPATIENT
Start: 2017-11-13 | End: 2017-11-14 | Stop reason: HOSPADM

## 2017-11-13 RX ADMIN — HEPARIN SODIUM 5000 UNITS: 5000 INJECTION, SOLUTION INTRAVENOUS; SUBCUTANEOUS at 21:24

## 2017-11-13 RX ADMIN — ATORVASTATIN CALCIUM 80 MG: 40 TABLET, FILM COATED ORAL at 21:24

## 2017-11-13 RX ADMIN — CEPHALEXIN 500 MG: 500 CAPSULE ORAL at 15:27

## 2017-11-13 RX ADMIN — ROPINIROLE HYDROCHLORIDE 0.5 MG: 0.5 TABLET, FILM COATED ORAL at 21:24

## 2017-11-13 RX ADMIN — HEPARIN SODIUM 5000 UNITS: 5000 INJECTION, SOLUTION INTRAVENOUS; SUBCUTANEOUS at 15:27

## 2017-11-13 RX ADMIN — ASPIRIN 325 MG ORAL TABLET 325 MG: 325 PILL ORAL at 09:42

## 2017-11-13 RX ADMIN — Medication 5 ML: at 06:23

## 2017-11-13 RX ADMIN — VALSARTAN 160 MG: 160 TABLET, FILM COATED ORAL at 09:42

## 2017-11-13 RX ADMIN — ACETAMINOPHEN 650 MG: 325 TABLET ORAL at 20:08

## 2017-11-13 RX ADMIN — HEPARIN SODIUM 5000 UNITS: 5000 INJECTION, SOLUTION INTRAVENOUS; SUBCUTANEOUS at 06:23

## 2017-11-13 RX ADMIN — Medication 10 ML: at 21:29

## 2017-11-13 RX ADMIN — CEPHALEXIN 500 MG: 500 CAPSULE ORAL at 21:24

## 2017-11-13 RX ADMIN — DONEPEZIL HYDROCHLORIDE 5 MG: 5 TABLET, FILM COATED ORAL at 21:24

## 2017-11-13 RX ADMIN — Medication 10 ML: at 15:28

## 2017-11-13 NOTE — PROGRESS NOTES
Problem: Mobility Impaired (Adult and Pediatric)  Goal: *Acute Goals and Plan of Care (Insert Text)  LTG:  (1.)Ms. Radha Givens will move from supine to sit and sit to supine, scoot up and down and roll side to side with STAND BY ASSIST within 7 day(s). (2.)Ms. Radha Givens will transfer from bed to chair and chair to bed with MINIMAL ASSIST using the least restrictive device within 7 day(s). (3.)Ms. Radha Givens will ambulate with MINIMAL ASSIST for 100 feet with the least restrictive device within 7 day(s). (4.)Ms. Radha Givens will perform seated exercises and functional activities for 15+ minutes to improve strength and mobility within 7 days. ________________________________________________________________________________________________    PHYSICAL THERAPY: Daily Note, Treatment Day: 1st, AM 11/13/2017  INPATIENT: Hospital Day: 5  Payor: Chet Priest / Plan: [address] [place] [address] HMO / Product Type: [employer] Medicare /      NAME/AGE/GENDER: Eddy Vega is a 80 y.o. female   PRIMARY DIAGNOSIS: Weakness  UTI (urinary tract infection)  Hypertensive emergency  UTI (urinary tract infection)  Hypertensive emergency  UTI (urinary tract infection) Ischemic stroke of frontal lobe ([address] Ischemic stroke of frontal lobe ([facility] [address]        ICD-10: Treatment Diagnosis:   · Generalized Muscle Weakness (M62.81)  · Other lack of cordination (R27.8)  · Difficulty in walking, Not elsewhere classified (R26.2)  · Other abnormalities of gait and mobility (R26.89)   Precaution/Allergies:  Review of patient's allergies indicates no known allergies. ASSESSMENT:     Ms. Radha Givens presents supine on contact sleeping and family at bedside. RN said it was OK for therapy to work with pt this morning. Pt agreeable to try. Worked on bed mobility and sitting at the bedside. At times pt able to sit with close SBA. Not too long after sitting pt got nauseated and started to vomit small amounts. RN came in as well due to her heart rate dropping to 39.  After her nausea settled we helped pt lay back down and get her positioned in supine and into a clean hospital gown. Will continue to follow. This section established at most recent assessment   PROBLEM LIST (Impairments causing functional limitations):  1. Decreased Strength  2. Decreased ADL/Functional Activities  3. Decreased Transfer Abilities  4. Decreased Ambulation Ability/Technique  5. Decreased Balance  6. Increased Pain  7. Decreased Activity Tolerance  8. Decreased Cognition   INTERVENTIONS PLANNED: (Benefits and precautions of physical therapy have been discussed with the patient.)  1. Balance Exercise  2. Bed Mobility  3. Gait Training  4. Therapeutic Activites  5. Therapeutic Exercise/Strengthening  6. Transfer Training  7. Group Therapy     TREATMENT PLAN: Frequency/Duration: 3 times a week for duration of hospital stay  Rehabilitation Potential For Stated Goals: Good     RECOMMENDED REHABILITATION/EQUIPMENT: (at time of discharge pending progress): Due to the probability of continued deficits (see above) this patient will likely need continued skilled physical therapy after discharge. Equipment:    to be determined pending progress              HISTORY:   History of Present Injury/Illness (Reason for Referral):  Per H&P, Brayden Noe is a 81 yo F with pmhx of CVA, cognitive decline, and hypertension, who presents to the ED with confusion and weakness. She is accompanied by her grandson-in-law, Wilian Mcduffie.     She knows she is at [facility], but she cannot answer what year or month it is Tom Ashraf states she normally knows this). Due to confusion, history obtained from Eitan Chang.     He reports that he and his wife could not get Ms. Caitlin Julian on the phone this morning. When they arrived to her house at ~11:30 AM, she could not get out of bed and walk. She normally ambulates very slowly with a rolling walker. He reports that she seemed confused and had difficulty talking.   He also stated that she seemed to lean to the left. She was brought to the ED and found to have a UTI on urinalysis. She had a CT head that did not show any hemorrhage and showed evidence of previous CVA. She denies pain, shortness of breath, or chest pain. She reports she feels 'very sleepy.'       During discussion with Raul Ruiz and the patient, she started to have breathing where she appeared to be breath holding. BP was rechecked and was 227/113. BP had been some elevated earlier, but not this elevated.     Per Raul Ruiz, she lives in a rented mobile home and has been there for years. She does not leave the house unless they take her somewhere. Mirza Posadas, patient's granddaughter, and Raul Ruiz help her with shopping and assist her with tasks around the house. \"  Past Medical History/Comorbidities:   Ms. Kumar Kirby  has a past medical history of Essential hypertension, benign (11/8/2013); Expressive aphasia (11/10/2013); GERD (gastroesophageal reflux disease); H/O appendicitis; History of appendicitis; History of bradycardia (11/8/2013); History of complete eye exam (04/2016); History of CVA (cerebrovascular accident) (11/8/2013); History of peptic ulcer (11/8/2013); Hyperlipidemia; Hypertension; Overactive bladder; Restless leg syndrome; Type II or unspecified type diabetes mellitus without mention of complication, not stated as uncontrolled (11/8/2013); and Wears dentures. Ms. Kumar Kirby  has a past surgical history that includes appendectomy and abdominal wall defect repair. Social History/Living Environment:   Home Environment: Trailer/mobile home  # Steps to Enter: 5  One/Two Story Residence: One story  Living Alone: Yes  Support Systems: Child(rahul), Family member(s)  Patient Expects to be Discharged to[de-identified] Rehabilitation facility  Current DME Used/Available at Home: Rochelle Courtney, [address] [facility] Prakash chair  Prior Level of Function/Work/Activity:  Lives alone. Has multiple family members who check on her frequently.  Mod I at baseline with short household distances using standard walker. Family assist with all ADLs and with majority of meals. Pt with hx CVAs in the past.      Number of Personal Factors/Comorbidities that affect the Plan of Care: 3+: HIGH COMPLEXITY   EXAMINATION:   Most Recent Physical Functioning:   Gross Assessment:                  Posture:     Balance:  Sitting: Impaired  Sitting - Static: Fair (occasional)  Sitting - Dynamic: Fair (occasional) Bed Mobility:  Supine to Sit: Maximum assistance  Scooting: Moderate assistance  Interventions: Safety awareness training;Verbal cues  Duration: 20 Minutes  Wheelchair Mobility:     Transfers:     Gait:            Body Structures Involved:  1. Nerves  2. Muscles Body Functions Affected:  1. Mental  2. Neuromusculoskeletal  3. Movement Related Activities and Participation Affected:  1. Learning and Applying Knowledge  2. General Tasks and Demands  3. Communication  4. Mobility  5. Self Care  6. Domestic Life  7. Interpersonal Interactions and Relationships  8. Community, Social and [place]   Number of elements that affect the Plan of Care: 4+: HIGH COMPLEXITY   CLINICAL PRESENTATION:   Presentation: Evolving clinical presentation with changing clinical characteristics: MODERATE COMPLEXITY   CLINICAL DECISION MAKIN [facility] Inpatient Short Form  How much difficulty does the patient currently have. .. Unable A Lot A Little None   1. Turning over in bed (including adjusting bedclothes, sheets and blankets)? [] 1   [] 2   [x] 3   [] 4   2. Sitting down on and standing up from a chair with arms ( e.g., wheelchair, bedside commode, etc.)   [] 1   [x] 2   [] 3   [] 4   3. Moving from lying on back to sitting on the side of the bed? [] 1   [x] 2   [] 3   [] 4   How much help from another person does the patient currently need. .. Total A Lot A Little None   4. Moving to and from a bed to a chair (including a wheelchair)? [] 1   [x] 2   [] 3   [] 4   5.   Need to walk in hospital room? [] 1   [x] 2   [] 3   [] 4   6. Climbing 3-5 steps with a railing? [x] 1   [] 2   [] 3   [] 4   © 2007, Trustees of [address] [place] [address] [address] [address], under license to [employer]. All rights reserved      Score:  Initial: 12 Most Recent: X (Date: -- )    Interpretation of Tool:  Represents activities that are increasingly more difficult (i.e. Bed mobility, Transfers, Gait). Score 24 23 22-20 19-15 14-10 9-7 6     Modifier CH CI CJ CK CL CM CN      ? Mobility - Walking and Moving Around:     - CURRENT STATUS: CL - 60%-79% impaired, limited or restricted    - GOAL STATUS: CK - 40%-59% impaired, limited or restricted    - D/C STATUS:  ---------------To be determined---------------  Payor: [employer] MEDICARE / Plan: [address] [place] [address] HMO / Product Type: Managed Care Medicare /      Medical Necessity:     · Patient demonstrates fair rehab potential due to higher previous functional level. Reason for Services/Other Comments:  · Patient continues to demonstrate capacity to improve strength, mobility, balance, transfers, activity tolerance which will increase independence, decrease amount of assistance required from caregiver and increase safety. Use of outcome tool(s) and clinical judgement create a POC that gives a: Questionable prediction of patient's progress: MODERATE COMPLEXITY            TREATMENT:   (In addition to Assessment/Re-Assessment sessions the following treatments were rendered)   Pre-treatment Symptoms/Complaints:  No complaints at rest, nausea after sitting  Pain: Initial: 0/10     Post Session:  0/10     Therapeutic Activity: (20 Minutes   ):  Therapeutic activities including Bed transfers and sitting balance at side of bed to improve mobility, strength and balance. Required minimum assist   to promote static and dynamic balance in sitting.      Braces/Orthotics/Lines/Etc:   · O2 Device: Room air  Treatment/Session Assessment:    · Response to Treatment:  Pt tolerated fair, some nausea and decreased heart rate  · Interdisciplinary Collaboration:   o Registered Nurse  · After treatment position/precautions:   o Supine in bed  o Bed/Chair-wheels locked  o Bed in low position  o Call light within reach  o RN notified  o Family at bedside   · Compliance with Program/Exercises: Will assess as treatment progresses. · Recommendations/Intent for next treatment session: \"Next visit will focus on advancements to more challenging activities and reduction in assistance provided\".   Total Treatment Duration:PT Patient Time In/Time Out  Time In: 0940  Time Out: Hans Valdez 1313, PT

## 2017-11-13 NOTE — PROGRESS NOTES
Pt appears very weak. States she is \"very tired and wants to sleep\". Oriented to self, place, and situation. Denies pain. Will continue to monitor.

## 2017-11-13 NOTE — PROGRESS NOTES
[employer] ins denied pt going to str. WEI spoke with pt's grandson in law. He's very concerned since  pt lives alone and can't care for herself at this time. MD can appeal the denial with a peer to peer ([phone]). WEI will request PT see pt tomorrow. WEI following.

## 2017-11-13 NOTE — PROGRESS NOTES
Progress Note    Patient: Abdi Vang MRN: [id number]  SSN: [id number]    YOB: 1924  Age: 80 y.o. Sex: female      Admit Date: 11/9/2017    LOS: 4 days     Subjective:     No issues overnight. Objective:     Vitals:    11/13/17 0344 11/13/17 0451 11/13/17 0738 11/13/17 1144   BP: 146/65  147/70 139/67   Pulse: 75  72 71   Resp: 20  18 15   Temp: 98.4 °F (36.9 °C)  97.7 °F (36.5 °C) 97.8 °F (36.6 °C)   SpO2: 96%  100% 98%   Weight:  56.2 kg (124 lb)     Height:              Physical Exam:   General: elderly  female resting in bed, oriented to person and place, month but not year  HEENT: NCAT, EOMI, OOP pink/moist   CV: RRR, no m/g/r   Lungs - CTAB, no wheezes/crackles   Abd - soft, NT, ND   Ext - no edema     Lab/Data Review:  No results found for this or any previous visit (from the past 24 hour(s)). Assessment:   Ms. Abdi Vang is a 80 y.o. female with PMH of CVA and HTN admitted for confusion and weakness and found to have R frontal CVA as well as UTI. Plan:   1. R frontal CVA - Confusion resolved. Continue ASA 325mg (was on 82mg previously at home), PT, OT. Decrease Lipitor dose to 40mg for better tolerance. .   2. UTI - E.coli in UCx. Switch to Keflex, day 5 of abx. 3. Dementia - Aricept. 4. HTN - Valsartan. 5. DVT ppx - heparin SQ    DNR  Dispo - [employer] denied STR. Pt is not safe to go home.     Signed By: Yolanda Lynn MD     November 13, 2017

## 2017-11-13 NOTE — PROGRESS NOTES
Received bedside shift report from Evelia Anne RN. Pt lying in bed. No apparent distress. Respirations even and unlabored. Instructed to call for assistance with needs, as they arise. Pt voiced understanding.

## 2017-11-13 NOTE — PROGRESS NOTES
While working with PT patient became sick and vomited a moderate amount of gray tinge emesis (appears to be breakfast, grits). HR dropped to 38 per monitor room. Pt denies any pain, dizziness. States she is exhausted and would like to lay back down. Placed new gown on pt, HOB at 90 degree angle, given emesis bag, and family is at bedside.

## 2017-11-13 NOTE — PROGRESS NOTES
STG: Pt will tolerate pureed/thin liquids without overt signs/sx of aspiration with 100% accuracy  STG: Pt will participate with cognitive-linguistic assessment x1  LTG: Pt will tolerate the least restrictive diet at discharge without respiratory compromise    Speech language pathology: bedside swallow note: Daily Note 1    NAME/AGE/GENDER: Angel Olson is a 80 y.o. female  DATE: 11/13/2017  PRIMARY DIAGNOSIS: Weakness  UTI (urinary tract infection)  Hypertensive emergency  UTI (urinary tract infection)  Hypertensive emergency  UTI (urinary tract infection)       ICD-10: Treatment Diagnosis: dysphagia; oral R13.11  INTERDISCIPLINARY COLLABORATION: Registered Nurse  PRECAUTIONS/ALLERGIES: Review of patient's allergies indicates no known allergies. ASSESSMENT:Patient seen for diet tolerance. Per family, she has tolerated the pureed diet but had a coughing incident with the grits this morning. Patient reports she likes yogurt so will substitute grits for yogurt in diet orders. Patient tolerated more than half of a supplement provided with no overt signs/sx of aspiration. Mild delayed initiation of the swallow and delayed propulsion. Tolerated half a container of puree without difficulty. Family in agreement that with oral delays patient is more likely to meet nutritional needs and will have improved safety with pureed diet. Recommend continue pureed/thin liquids. Meds crushed in puree. Supplements added to diet orders. Reviewed safe swallowing strategies to include HOB raised and small bites/sips. Will follow for dysphagia and cognition. Patient will benefit from skilled intervention to address the below impairments. ?????? ? ? This section established at most recent assessment??????????  PROBLEM LIST (Impairments causing functional limitations):  1.  Dysphagia  2. cognition  REHABILITATION POTENTIAL FOR STATED GOALS: Good  PLAN OF CARE:   Patient will benefit from skilled intervention to address the following impairments. RECOMMENDATIONS AND PLANNED INTERVENTIONS (Benefits and precautions of therapy have been discussed with the patient.):  · PO:  Pureed  · Liquids:  regular thin  MEDICATIONS:  · Crushed in puree  COMPENSATORY STRATEGIES/MODIFICATIONS INCLUDING:  · Small sips and bites  OTHER RECOMMENDATIONS (including follow up treatment recommendations): · Family training/education  · Patient education  RECOMMENDED DIET MODIFICATIONS DISCUSSED WITH:  · Nursing  · Patient  FREQUENCY/DURATION: Continue to follow patient 3 times a week for duration of hospital stay to address above goals. RECOMMENDED REHABILITATION/EQUIPMENT: (at time of discharge pending progress): Due to the probability of continued deficits (see above) this patient will likely need continued skilled speech therapy after discharge. SUBJECTIVE:   Supportive family at bedside. History of Present Injury/Illness: Ms. Lenna Canavan  has a past medical history of Essential hypertension, benign (11/8/2013); Expressive aphasia (11/10/2013); GERD (gastroesophageal reflux disease); H/O appendicitis; History of appendicitis; History of bradycardia (11/8/2013); History of complete eye exam (04/2016); History of CVA (cerebrovascular accident) (11/8/2013); History of peptic ulcer (11/8/2013); Hyperlipidemia; Hypertension; Overactive bladder; Restless leg syndrome; Type II or unspecified type diabetes mellitus without mention of complication, not stated as uncontrolled (11/8/2013); and Wears dentures. She also  has a past surgical history that includes appendectomy and abdominal wall defect repair. Present Symptoms: prolonged mastication; processing issues  Pain Intensity 1: 0  Current Medications:   No current facility-administered medications on file prior to encounter.       Current Outpatient Prescriptions on File Prior to Encounter   Medication Sig Dispense Refill    solifenacin (VESICARE) 10 mg tablet TAKE 1 TABLET BY MOUTH EVERY DAY 90 Tab 3    valsartan (DIOVAN) 160 mg tablet Take 1 Tab by mouth daily. Indications: hypertension 90 Tab 3    donepezil (ARICEPT) 5 mg tablet TAKE 1 TABLET BY MOUTH EVERY EVENING 90 Tab 3    aspirin delayed-release 81 mg tablet Take  by mouth daily. Current Dietary Status:  NPO pending consult      Social History/Home Situation: home alone  Home Environment: Trailer/mobile home  # Steps to Enter: 5  One/Two Story Residence: One story  Living Alone: Yes  Support Systems: Child(rahul), Family member(s)  Patient Expects to be Discharged to[de-identified] Rehabilitation facility  Current DME Used/Available at Home: [employer], Shower chair  OBJECTIVE:   Respiratory Status:        CXR Results: No acute disease. Cardiomegaly. MRI/CT Results:Acute to early subacute infarct in the right frontal lobe.     2. Old infarcts in the cerebellum and left parietal lobe.     3. Advanced cerebral volume loss with hippocampal atrophy and white matter  findings compatible with chronic small vessel ischemic disease. Oral Motor Structure/Speech:  Oral-Motor Structure/Motor Speech  Labial: No impairment  Dentition: Edentulous  Oral Hygiene: fair  Lingual: Decreased rate    Cognitive and Communication Status:  Neurologic State: Alert  Orientation Level: Oriented to person;Oriented to place;Oriented to time  Cognition: Follows commands  Perception: Appears intact  Perseveration: No perseveration noted  Safety/Judgement: Fall prevention    BEDSIDE SWALLOW EVALUATION  Oral Assessment:  Oral Assessment  Labial: No impairment  Dentition: Edentulous  Lingual: Decreased rate  P.O. Trials:  Patient Position: upright in bed    The patient was given tsp to straw amounts of the following:   Consistency Presented:  Thin liquid;Puree;Nectar thick liquid  How Presented: Self-fed/presented;Straw    ORAL PHASE:  Bolus Acceptance: No impairment  Bolus Formation/Control: Impaired  Propulsion: Delayed (# of seconds)  Type of Impairment: Delayed  Oral Residue: None    PHARYNGEAL PHASE:  Initiation of Swallow: Delayed (# of seconds)  Laryngeal Elevation: Functional  Aspiration Signs/Symptoms: None  Vocal Quality: No impairment           Pharyngeal Phase Characteristics: No impairment, issues, or problems     OTHER OBSERVATIONS:  Rate/bite size: WNL   Endurance: WNL     Tool Used: Dysphagia Outcome and Severity Scale (KRISTINE)    Score Comments   Normal Diet  [] 7 With no strategies or extra time needed   Functional Swallow  [] 6 May have mild oral or pharyngeal delay       Mild Dysphagia    [] 5 Which may require one diet consistency restricted (those who demonstrate penetration which is entirely cleared on MBS would be included)   Mild-Moderate Dysphagia  [x] 4 With 1-2 diet consistencies restricted       Moderate Dysphagia  [] 3 With 2 or more diet consistencies restricted       Moderately Severe Dysphagia  [] 2 With partial PO strategies (trials with ST only)       Severe Dysphagia  [] 1 With inability to tolerate any PO safely          Score:  Initial: 4 Most Recent: X (Date: -- )   Interpretation of Tool: The Dysphagia Outcome and Severity Scale (KRISTINE) is a simple, easy-to-use, 7-point scale developed to systematically rate the functional severity of dysphagia based on objective assessment and make recommendations for diet level, independence level, and type of nutrition. Score 7 6 5 4 3 2 1   Modifier CH CI CJ CK CL CM CN   ? Swallowing:     - CURRENT STATUS: CK - 40%-59% impaired, limited or restricted    - GOAL STATUS:  CJ - 20%-39% impaired, limited or restricted    - D/C STATUS:  ---------------To be determined---------------  Payor: Lena Blackburn / Plan: [address] [place] [address] HMO / Product Type: Managed Care Medicare /     TREATMENT:    (In addition to Assessment/Re-Assessment sessions the following treatments were rendered)  Dysphagia Activities: Activities/Procedures listed utilized to improve progress in diet tolerance and swallow safety.  Required minimal cueing to improve swallow safety. MODALITIES:                                                                  ORAL MOTOR  EXERCISES:                                                                                                                                                                      LARYNGEAL / PHARYNGEAL EXERCISES:                                                                                                                                     __________________________________________________________________________________________________  Safety:   After treatment position/precautions:  · Up in chair  · RN notified  · Family at bedside  Progression/Medical Necessity:   · Skilled intervention continues to be required due to patient still consuming a modified diet and unable to attend/participate in therapy as expected. Compliance with Program/Exercises: Will assess as treatment progresses. Reason for Continuation of Services/Other Comments:  · Patient continues to require skilled intervention due to patient unable to attend/participate in therapy as expected. Recommendations/Intent for next treatment session: \"Treatment next visit will focus on diet tolerance; po trialscognitive assessment\".     Total Treatment Duration:  Time In: 0903  Time Out: [address] Randall Hernández MS, CCC-SLP

## 2017-11-14 VITALS
DIASTOLIC BLOOD PRESSURE: 65 MMHG | OXYGEN SATURATION: 97 % | WEIGHT: 122.1 LBS | HEART RATE: 66 BPM | HEIGHT: 65 IN | BODY MASS INDEX: 20.34 KG/M2 | RESPIRATION RATE: 20 BRPM | TEMPERATURE: 97.9 F | SYSTOLIC BLOOD PRESSURE: 150 MMHG

## 2017-11-14 PROCEDURE — 74011250637 HC RX REV CODE- 250/637: Performed by: INTERNAL MEDICINE

## 2017-11-14 PROCEDURE — 97110 THERAPEUTIC EXERCISES: CPT

## 2017-11-14 PROCEDURE — 97116 GAIT TRAINING THERAPY: CPT

## 2017-11-14 PROCEDURE — 86580 TB INTRADERMAL TEST: CPT | Performed by: INTERNAL MEDICINE

## 2017-11-14 PROCEDURE — 97530 THERAPEUTIC ACTIVITIES: CPT

## 2017-11-14 PROCEDURE — 74011250636 HC RX REV CODE- 250/636: Performed by: INTERNAL MEDICINE

## 2017-11-14 PROCEDURE — 74011000302 HC RX REV CODE- 302: Performed by: INTERNAL MEDICINE

## 2017-11-14 PROCEDURE — 92526 ORAL FUNCTION THERAPY: CPT

## 2017-11-14 RX ORDER — ATORVASTATIN CALCIUM 40 MG/1
40 TABLET, FILM COATED ORAL DAILY
Qty: 30 TAB | Refills: 0 | Status: SHIPPED | OUTPATIENT
Start: 2017-11-14

## 2017-11-14 RX ORDER — CEPHALEXIN 500 MG/1
500 CAPSULE ORAL EVERY 8 HOURS
Qty: 6 CAP | Refills: 0 | Status: SHIPPED
Start: 2017-11-14 | End: 2017-11-16

## 2017-11-14 RX ORDER — VALSARTAN 160 MG/1
160 TABLET ORAL ONCE
Status: COMPLETED | OUTPATIENT
Start: 2017-11-14 | End: 2017-11-14

## 2017-11-14 RX ORDER — VALSARTAN 160 MG/1
320 TABLET ORAL DAILY
Qty: 90 TAB | Refills: 3 | Status: SHIPPED | OUTPATIENT
Start: 2017-11-14 | End: 2018-11-14

## 2017-11-14 RX ORDER — ASPIRIN 325 MG
325 TABLET ORAL DAILY
Qty: 30 TAB | Refills: 0 | Status: SHIPPED
Start: 2017-11-15

## 2017-11-14 RX ORDER — ACETAMINOPHEN 325 MG/1
650 TABLET ORAL
Qty: 30 TAB | Refills: 0 | Status: SHIPPED
Start: 2017-11-14

## 2017-11-14 RX ADMIN — VALSARTAN 160 MG: 160 TABLET, FILM COATED ORAL at 14:31

## 2017-11-14 RX ADMIN — VALSARTAN 160 MG: 160 TABLET, FILM COATED ORAL at 08:40

## 2017-11-14 RX ADMIN — CEPHALEXIN 500 MG: 500 CAPSULE ORAL at 05:41

## 2017-11-14 RX ADMIN — TRAMADOL HYDROCHLORIDE 50 MG: 50 TABLET, FILM COATED ORAL at 13:14

## 2017-11-14 RX ADMIN — HEPARIN SODIUM 5000 UNITS: 5000 INJECTION, SOLUTION INTRAVENOUS; SUBCUTANEOUS at 05:41

## 2017-11-14 RX ADMIN — Medication 5 ML: at 05:43

## 2017-11-14 RX ADMIN — TUBERCULIN PURIFIED PROTEIN DERIVATIVE 5 UNITS: 5 INJECTION INTRADERMAL at 15:04

## 2017-11-14 RX ADMIN — CEPHALEXIN 500 MG: 500 CAPSULE ORAL at 13:14

## 2017-11-14 RX ADMIN — Medication 5 ML: at 13:15

## 2017-11-14 RX ADMIN — HEPARIN SODIUM 5000 UNITS: 5000 INJECTION, SOLUTION INTRAVENOUS; SUBCUTANEOUS at 13:14

## 2017-11-14 RX ADMIN — ASPIRIN 325 MG ORAL TABLET 325 MG: 325 PILL ORAL at 08:36

## 2017-11-14 NOTE — PROGRESS NOTES
Assessment completed. Pt resting. Oriented to self, place, and situation. States she is tired. Purposeful movement to all extremities except right lower extremities. Pt has not been able to ambulate since admission due to weakness/drowsiness. Pt is sleeping most of the day and all night. States she is \"just so tired\". Aware to call for assistance when needed. Bed locked, in low position, call light in reach. Will monitor.

## 2017-11-14 NOTE — PROGRESS NOTES
Problem: Self Care Deficits Care Plan (Adult)  Goal: *Acute Goals and Plan of Care (Insert Text)  1. Adriana Luevano will complete grooming with standby assistance within 7 visit(s). 2. Adriana Luevano will complete upper body bathing and dressing while seated in chair with standby assistance within 7 visit(s). 3. Adriana Luevano will require minimal assistance to maintain standing balance at midline x 5 minutes in prep for ADL within 7 visits. [address] Rasmussen will demonstrate good attention to the left hemifield using with minimal cues within 5 visits. Rosa Rasmussen will complete lower body bathing and dressing with moderate assistance within 7 visits. 6. Adriana Luevano will require minimal assist for bed to chair/BSC within 7 visits. OCCUPATIONAL THERAPY: Daily Note, Treatment Day: 2nd and AM 11/14/2017  INPATIENT: Hospital Day: 6  Payor: Garland Greenwood / Plan: [address] [place] [address] HMO / Product Type: [employer] /      NAME/AGE/GENDER: Adriana Luevano is a 80 y.o. female   PRIMARY DIAGNOSIS:  Weakness  UTI (urinary tract infection)  Hypertensive emergency  UTI (urinary tract infection)  Hypertensive emergency  UTI (urinary tract infection) Ischemic stroke of frontal lobe ([facility] [address] Ischemic stroke of frontal lobe ([facility] [address]        ICD-10: Treatment Diagnosis:    · Other lack of cordination (R27.8)  · History of falling (Z91.81)   Precautions/Allergies:     Review of patient's allergies indicates no known allergies. ASSESSMENT:     Ms. Diego Garcia presents s/p acute R frontal lobe CVA and history of occipital/L parietal lobe infarcts per today's MRI. Pt was supine in bed when therapist arrived. Pt completed bed mobility and functional mobility with min assistance X 2. Pt is functioning below base line. Pt continues to require OT to maximize independence with ADL's. This section established at most recent assessment   PROBLEM LIST (Impairments causing functional limitations):  1.  Decreased Strength  2. Decreased ADL/Functional Activities  3. Decreased Transfer Abilities  4. Decreased Balance  5. Decreased Activity Tolerance  6. Decreased Flexibility/Joint Mobility  7. Decreased Cognition   INTERVENTIONS PLANNED: (Benefits and precautions of occupational therapy have been discussed with the patient.)  1. Activities of daily living training  2. Cognitive training  3. Neuromuscular re-eduation  4. Therapeutic activity  5. Therapeutic exercise     TREATMENT PLAN: Frequency/Duration: Follow patient 3 times/week to address above goals. Rehabilitation Potential For Stated Goals: Good     RECOMMENDED REHABILITATION/EQUIPMENT: (at time of discharge pending progress): Due to the probability of continued deficits (see above) this patient will likely need continued skilled occupational therapy after discharge. Equipment:    None at this time              OCCUPATIONAL PROFILE AND HISTORY:   History of Present Injury/Illness (Reason for Referral):  Per MD H & P: Pastor Price is a 79 yo F with pmhx of CVA, cognitive decline, and hypertension, who presents to the ED with confusion and weakness. She is accompanied by her grandson-in-law, Richi Bansal.     She knows she is at [facility], but she cannot answer what year or month it is Lornaraeann Courtney states she normally knows this). Due to confusion, history obtained from Luis Angel Leal.     He reports that he and his wife could not get Ms. Lindalou Goldberg on the phone this morning. When they arrived to her house at ~11:30 AM, she could not get out of bed and walk. She normally ambulates very slowly with a rolling walker. He reports that she seemed confused and had difficulty talking. He also stated that she seemed to lean to the left. She was brought to the ED and found to have a UTI on urinalysis. She had a CT head that did not show any hemorrhage and showed evidence of previous CVA. She denies pain, shortness of breath, or chest pain.   She reports she feels 'very sleepy.'    During discussion with Vale Pickett and the patient, she started to have breathing where she appeared to be breath holding. BP was rechecked and was 227/113. BP had been some elevated earlier, but not this elevated.     Per Vale Pickett, she lives in a rented mobile home and has been there for years. She does not leave the house unless they take her somewhere. Willow Petersen, patient's granddaughter, and Vale Pickett help her with shopping and assist her with tasks around the house. MRI Brain (11/10/17): IMPRESSION:     1. Acute to early subacute infarct in the right frontal lobe.     2. Old infarcts in the cerebellum and left parietal lobe.     3. Advanced cerebral volume loss with hippocampal atrophy and white matter  findings compatible with chronic small vessel ischemic disease. Past Medical History/Comorbidities:   Ms. Nicolette Diaz  has a past medical history of Essential hypertension, benign (11/8/2013); Expressive aphasia (11/10/2013); GERD (gastroesophageal reflux disease); H/O appendicitis; History of appendicitis; History of bradycardia (11/8/2013); History of complete eye exam (04/2016); History of CVA (cerebrovascular accident) (11/8/2013); History of peptic ulcer (11/8/2013); Hyperlipidemia; Hypertension; Overactive bladder; Restless leg syndrome; Type II or unspecified type diabetes mellitus without mention of complication, not stated as uncontrolled (11/8/2013); and Wears dentures. Ms. Nicolette Diaz  has a past surgical history that includes appendectomy and abdominal wall defect repair. Social History/Living Environment:   Home Environment: Trailer/mobile home  # Steps to Enter: 5  One/Two Story Residence: One story  Living Alone: Yes  Support Systems: Child(rahul), Family member(s)  Patient Expects to be Discharged to[de-identified] Rehabilitation facility  Current DME Used/Available at Home: Linwood Sicard, [address] [facility] Prakash chair  Prior Level of Function/Work/Activity:  Lives alone and typically requires assistance with bathing/dressing/meal prep.   Uses a standard walker and reports falls without injury with BLE crossing per granddaughter's  Launie Flow. Patient states she manages her own medications (only a few per Launie Flow). Heats up her meals as needed and wears a life alert. Previous Treatment Approaches:          Home health therapy. Number of Personal Factors/Comorbidities that affect the Plan of Care: Expanded review of therapy/medical records (1-2):  MODERATE COMPLEXITY   ASSESSMENT OF OCCUPATIONAL PERFORMANCE[de-identified]   Activities of Daily Living:           Basic ADLs (From Assessment) Complex ADLs (From Assessment)   Basic ADL  Feeding: Moderate assistance  Oral Facial Hygiene/Grooming: Moderate assistance  Bathing: Total assistance  Upper Body Dressing: Maximum assistance  Lower Body Dressing: Total assistance  Toileting: Maximum assistance Instrumental ADL  Meal Preparation: Total assistance  Homemaking: Total assistance   Grooming/Bathing/Dressing Activities of Daily Living     Cognitive Retraining  Safety/Judgement: Fall prevention                       Bed/Mat Mobility  Supine to Sit: Minimum assistance  Sit to Stand: Minimum assistance  Bed to Chair: Minimum assistance       Most Recent Physical Functioning:   Gross Assessment:                  Posture:  Posture (WDL): Exceptions to WDL  Posture Assessment:  Forward head, Rounded shoulders, Trunk flexion  Balance:  Sitting: Impaired  Sitting - Static: Fair (occasional)  Sitting - Dynamic: Fair (occasional)  Standing: Impaired  Standing - Static: Fair;Constant support  Standing - Dynamic : Fair Bed Mobility:  Supine to Sit: Minimum assistance  Wheelchair Mobility:     Transfers:  Sit to Stand: Minimum assistance  Stand to Sit: Minimum assistance  Bed to Chair: Minimum assistance                Patient Vitals for the past 6 hrs:   BP BP Patient Position SpO2 Pulse   11/14/17 0723 139/81 At rest 96 % 66   11/14/17 1125 177/78 At rest 95 % 68     Vision: Tracks object in all quadrants; L hemifield inattention noted.  Able to read clock accurately with verbal/visual cues. Mental Status  Neurologic State: Alert  Orientation Level: Oriented to person  Cognition: Follows commands  Perception: Verbal, Tactile  Perseveration: Tactile cues provided, Verbal cues provided  Safety/Judgement: Fall prevention                          Physical Skills Involved:  1. Range of Motion  2. Balance  3. Strength  4. Activity Tolerance  5. Gross Motor Control Cognitive Skills Affected (resulting in the inability to perform in a timely and safe manner):  1. Perception  2. Executive Function  3. Immediate Memory  4. Short Term Recall  5. Long Term Memory  6. Sustained Attention  7. Divided Attention Psychosocial Skills Affected:  1. Habits/Routines  2. Environmental Adaptation  3. Social Interaction  4. Self-Awareness  5. Awareness of Others  6. Social Roles   Number of elements that affect the Plan of Care: 3-5:  MODERATE COMPLEXITY   CLINICAL DECISION MAKIN[address] [place], [place] [id number] AM-PAC 6 Clicks   Daily Activity Inpatient Short Form  How much help from another person does the patient currently need. .. Total A Lot A Little None   1. Putting on and taking off regular lower body clothing? [x] 1   [] 2   [] 3   [] 4   2. Bathing (including washing, rinsing, drying)? [x] 1   [] 2   [] 3   [] 4   3. Toileting, which includes using toilet, bedpan or urinal?   [x] 1   [] 2   [] 3   [] 4   4. Putting on and taking off regular upper body clothing? [] 1   [x] 2   [] 3   [] 4   5. Taking care of personal grooming such as brushing teeth? [] 1   [x] 2   [] 3   [] 4   6. Eating meals? [] 1   [x] 2   [] 3   [] 4   © , Trustees of [address] [place] [id number], under license to [employer]. All rights reserved      Score:  Initial: 9 Most Recent: X (Date: -- )    Interpretation of Tool:  Represents activities that are increasingly more difficult (i.e. Bed mobility, Transfers, Gait).    Score 24 23 22-20 19-15 14-10 9-7 6     Modifier CH CI CJ CK CL CM CN      ? Self Care:     - CURRENT STATUS: CM - 80%-99% impaired, limited or restricted    - GOAL STATUS: CK - 40%-59% impaired, limited or restricted    - D/C STATUS:  ---------------To be determined---------------  Payor: Eriberto Solorio / Plan: [address] [place] [address] HMO / Product Type: Managed Care Medicare /      Medical Necessity:     · Patient demonstrates good rehab potential due to higher previous functional level. Reason for Services/Other Comments:  · Patient continues to require skilled intervention due to decreased independence with ADL, instrumental ADL, and functional mobility for ADL. Use of outcome tool(s) and clinical judgement create a POC that gives a: HIGH COMPLEXITY         TREATMENT:   (In addition to Assessment/Re-Assessment sessions the following treatments were rendered)     Pre-treatment Symptoms/Complaints:    Pain: Initial:   Pain Intensity 1: 0  Post Session:  same    Therapeutic Activity: (    10): Therapeutic activities including functional mobility and bed mobility to improve mobility and strength. Required min Safety awareness training;Verbal cues;Manual cues to promote motor control of bilateral, upper extremity(s), lower extremity(s). Braces/Orthotics/Lines/Etc:   · IV  · O2 Device: Room air  Treatment/Session Assessment:    Response to Treatment:  Tolerated treatment well without complications. · Interdisciplinary Collaboration:   o Physical Therapist  o Certified Occupational Therapy Assistant  o Registered Nurse  o Certified Nursing Assistant/Patient Care Technician  · After treatment position/precautions:   o Up in chair  o Bed/Chair-wheels locked  o Call light within reach  o Family at bedside   · Compliance with Program/Exercises: Will assess as treatment progresses. · Recommendations/Intent for next treatment session: \"Next visit will focus on advancements to more challenging activities\".   Total Treatment Duration:OT Patient Time In/Time Out  Time In: 0950  Time Out: Adan Davidson

## 2017-11-14 NOTE — PROGRESS NOTES
Patient received in bed c/o headache. Patient medicated with Tylenol 650 mg po as per MD order. Shift assessment completed, will monitor.

## 2017-11-14 NOTE — PROGRESS NOTES
Problem: Mobility Impaired (Adult and Pediatric)  Goal: *Acute Goals and Plan of Care (Insert Text)  LTG:  (1.)Ms. Stephon Knapp will move from supine to sit and sit to supine, scoot up and down and roll side to side with STAND BY ASSIST within 7 day(s). (2.)Ms. Stephon Knapp will transfer from bed to chair and chair to bed with MINIMAL ASSIST using the least restrictive device within 7 day(s). (3.)Ms. Stephon Knapp will ambulate with MINIMAL ASSIST for 100 feet with the least restrictive device within 7 day(s). (4.)Ms. Stephon Knapp will perform seated exercises and functional activities for 15+ minutes to improve strength and mobility within 7 days. ________________________________________________________________________________________________    PHYSICAL THERAPY: Daily Note, Treatment Day: 2nd, AM 11/14/2017  INPATIENT: Hospital Day: 6  Payor: Laurie Sanchez / Plan: [address] [place] [address] HMO / Product Type: Managed Care Medicare /      NAME/AGE/GENDER: Svitlana King is a 80 y.o. female   PRIMARY DIAGNOSIS: Weakness  UTI (urinary tract infection)  Hypertensive emergency  UTI (urinary tract infection)  Hypertensive emergency  UTI (urinary tract infection) Ischemic stroke of frontal lobe ([address] Ischemic stroke of frontal lobe ([address]        ICD-10: Treatment Diagnosis:   · Generalized Muscle Weakness (M62.81)  · Other lack of cordination (R27.8)  · Difficulty in walking, Not elsewhere classified (R26.2)  · Other abnormalities of gait and mobility (R26.89)   Precaution/Allergies:  Review of patient's allergies indicates no known allergies. ASSESSMENT:     Ms. Stephon Knapp presents sitting in the recliner chair working with OT with her son at the bedside. Patient agreed to have therapy come in and start therapy since she wanted to try and get up to walk. Her sitting posture in the recliner was poor with her leaning to the left and needing assistance to sit up. Upright sitting requires minimal assist with verbal cues to stay centered. Noted RLE knee swelling and some complaints of pain with movement. Reported this to nursing. Sit to stand is minimal assist x 2 using the r/walker for BUE support. She ambulated 15' with minimal assist and she was able to advance her RLE independently during gait. She complains of RLE discomfort with weight bearing and movement. She sat and rested then ambulated a 2nd time 10' with minimal assist of 2 using the r/walker. She needs verbal and manual cues for safety with turning and sitting as she tends to sit early before getting completely around. She was positioned for comfort and support in the recliner chair and she performed BLE AAROM exercises while seated. She was alert and cooperative and attempted all tasks asked of her. Her grandson was present and is very supportive and helps to motivate her for out of bed activity. She was left sitting in the recliner with her grandson at the bedside. Prior to this admission the patient reportedly was functioning at a modified independent level and was able to live alone with support from her family members as needed. Patient would benefit from continued post acute care to maximize her functional abilities. This section established at most recent assessment   PROBLEM LIST (Impairments causing functional limitations):  1. Decreased Strength  2. Decreased ADL/Functional Activities  3. Decreased Transfer Abilities  4. Decreased Ambulation Ability/Technique  5. Decreased Balance  6. Increased Pain  7. Decreased Activity Tolerance  8. Decreased Cognition   INTERVENTIONS PLANNED: (Benefits and precautions of physical therapy have been discussed with the patient.)  1. Balance Exercise  2. Bed Mobility  3. Gait Training  4. Therapeutic Activites  5. Therapeutic Exercise/Strengthening  6. Transfer Training  7.  Group Therapy     TREATMENT PLAN: Frequency/Duration: 3 times a week for duration of hospital stay  Rehabilitation Potential For Stated Goals: Good RECOMMENDED REHABILITATION/EQUIPMENT: (at time of discharge pending progress): Due to the probability of continued deficits (see above) this patient will likely need continued skilled physical therapy after discharge. Equipment:    to be determined pending progress              HISTORY:   History of Present Injury/Illness (Reason for Referral):  Per H&P, Susu Merchant is a 79 yo F with pmhx of CVA, cognitive decline, and hypertension, who presents to the ED with confusion and weakness. She is accompanied by her grandson-in-law, Marcia Greenberg.     She knows she is at [facility], but she cannot answer what year or month it is Kelechi Last states she normally knows this). Due to confusion, history obtained from Nuno Navarro.     He reports that he and his wife could not get Ms. Leno Felix on the phone this morning. When they arrived to her house at ~11:30 AM, she could not get out of bed and walk. She normally ambulates very slowly with a rolling walker. He reports that she seemed confused and had difficulty talking. He also stated that she seemed to lean to the left. She was brought to the ED and found to have a UTI on urinalysis. She had a CT head that did not show any hemorrhage and showed evidence of previous CVA. She denies pain, shortness of breath, or chest pain. She reports she feels 'very sleepy.'       During discussion with Nuno Navarro and the patient, she started to have breathing where she appeared to be breath holding. BP was rechecked and was 227/113. BP had been some elevated earlier, but not this elevated.     Per Nuno Navarro, she lives in a rented mobile home and has been there for years. She does not leave the house unless they take her somewhere. Cait Shepard, patient's granddaughter, and Nuno Navarro help her with shopping and assist her with tasks around the house. \"  Past Medical History/Comorbidities:   Ms. Leno Felix  has a past medical history of Essential hypertension, benign (11/8/2013);  Expressive aphasia (11/10/2013); GERD (gastroesophageal reflux disease); H/O appendicitis; History of appendicitis; History of bradycardia (11/8/2013); History of complete eye exam (04/2016); History of CVA (cerebrovascular accident) (11/8/2013); History of peptic ulcer (11/8/2013); Hyperlipidemia; Hypertension; Overactive bladder; Restless leg syndrome; Type II or unspecified type diabetes mellitus without mention of complication, not stated as uncontrolled (11/8/2013); and Wears dentures. Ms. Lucian Stein  has a past surgical history that includes appendectomy and abdominal wall defect repair. Social History/Living Environment:   Home Environment: Trailer/mobile home  # Steps to Enter: 5  One/Two Story Residence: One story  Living Alone: Yes  Support Systems: Child(rahul), Family member(s)  Patient Expects to be Discharged to[de-identified] Rehabilitation facility  Current DME Used/Available at Home: Luis F Jara, [address] [employer] [employer] chair  Prior Level of Function/Work/Activity:  Lives alone. Has multiple family members who check on her frequently. Mod I at baseline with short household distances using standard walker. Family assist with all ADLs and with majority of meals. Pt with hx CVAs in the past.      Number of Personal Factors/Comorbidities that affect the Plan of Care: 3+: HIGH COMPLEXITY   EXAMINATION:   Most Recent Physical Functioning:   Gross Assessment:                  Posture:     Balance:  Sitting: Impaired  Sitting - Static: Fair (occasional)  Sitting - Dynamic: Fair (occasional)  Standing: Impaired  Standing - Static: Fair;Constant support  Standing - Dynamic : Fair Bed Mobility:     Wheelchair Mobility:     Transfers:  Sit to Stand: Minimum assistance;Assist x2  Stand to Sit: Minimum assistance  Bed to Chair: Minimum assistance;Assist x2  Gait:     Base of Support: Center of gravity altered  Speed/Alannah: Slow;Shuffled  Step Length: Left shortened;Right shortened  Gait Abnormalities: Decreased step clearance; Path deviations  Distance (ft): 12 Feet (ft) (10' 2nd walk)  Assistive Device: Walker, rolling  Interventions: Safety awareness training;Verbal cues;Manual cues      Body Structures Involved:  1. Nerves  2. Muscles Body Functions Affected:  1. Mental  2. Neuromusculoskeletal  3. Movement Related Activities and Participation Affected:  1. Learning and Applying Knowledge  2. General Tasks and Demands  3. Communication  4. Mobility  5. Self Care  6. Domestic Life  7. Interpersonal Interactions and Relationships  8. Community, Social and [place]   Number of elements that affect the Plan of Care: 4+: HIGH COMPLEXITY   CLINICAL PRESENTATION:   Presentation: Evolving clinical presentation with changing clinical characteristics: MODERATE COMPLEXITY   CLINICAL DECISION MAKIN [facility] Mobility Inpatient Short Form  How much difficulty does the patient currently have. .. Unable A Lot A Little None   1. Turning over in bed (including adjusting bedclothes, sheets and blankets)? [] 1   [] 2   [x] 3   [] 4   2. Sitting down on and standing up from a chair with arms ( e.g., wheelchair, bedside commode, etc.)   [] 1   [x] 2   [] 3   [] 4   3. Moving from lying on back to sitting on the side of the bed? [] 1   [x] 2   [] 3   [] 4   How much help from another person does the patient currently need. .. Total A Lot A Little None   4. Moving to and from a bed to a chair (including a wheelchair)? [] 1   [x] 2   [] 3   [] 4   5. Need to walk in hospital room? [] 1   [x] 2   [] 3   [] 4   6. Climbing 3-5 steps with a railing? [x] 1   [] 2   [] 3   [] 4   © , Trustees of [address] [place] [address] [address] [address], under license to [employer]. All rights reserved      Score:  Initial: 12 Most Recent: X (Date: -- )    Interpretation of Tool:  Represents activities that are increasingly more difficult (i.e. Bed mobility, Transfers, Gait). Score 24 23 22-20 19-15 14-10 9-7 6     Modifier CH CI CJ CK CL CM CN      ?  Mobility - Walking and Moving Around:     - CURRENT STATUS: CL - 60%-79% impaired, limited or restricted    - GOAL STATUS: CK - 40%-59% impaired, limited or restricted    - D/C STATUS:  ---------------To be determined---------------  Payor: [employer] MEDICARE / Plan: [address] [place] [address] HMO / Product Type: Managed Care Medicare /      Medical Necessity:     · Patient demonstrates fair rehab potential due to higher previous functional level. Reason for Services/Other Comments:  · Patient continues to demonstrate capacity to improve strength, mobility, balance, transfers, activity tolerance which will increase independence, decrease amount of assistance required from caregiver and increase safety. Use of outcome tool(s) and clinical judgement create a POC that gives a: Questionable prediction of patient's progress: MODERATE COMPLEXITY            TREATMENT:   (In addition to Assessment/Re-Assessment sessions the following treatments were rendered)   Pre-treatment Symptoms/Complaints:  Mild complaint of RLE knee/leg discomfort  Pain: Initial: 0/10  Pain Intensity 1: 0  Post Session:  0/10     Therapeutic Activity: (  25 minutes  ):  Therapeutic activities including Bed transfers, short distance ambulation, sitting balance and BLE AAROM exercises to improve mobility, strength and balance. Required minimum assist Safety awareness training;Verbal cues;Manual cues to promote static and dynamic balance in sitting. Braces/Orthotics/Lines/Etc:   · O2 Device: Room air  Treatment/Session Assessment:    · Response to Treatment: Patient participated and tolerated therapy well today. · Interdisciplinary Collaboration:   o Registered Nurse  · After treatment position/precautions:   o Up in chair  o Bed/Chair-wheels locked  o Call light within reach  o RN notified  o Family at bedside   · Compliance with Program/Exercises: Will assess as treatment progresses. · Recommendations/Intent for next treatment session:   \"Next visit will focus on advancements to more challenging activities and reduction in assistance provided\".   Total Treatment Duration:PT Patient Time In/Time Out  Time In: 6864  Time Out: 70 Wili Kaur

## 2017-11-14 NOTE — DISCHARGE SUMMARY
Discharge Summary     Patient: Eddy Vega MRN: [id number]  SSN: [id number]    YOB: 1924  Age: 80 y.o. Sex: female       Patient: Eddy Vega  : 1924 MRN: [id number]      PCP: Aleta Cooper MD   Age: 80 y.o. Code Status: DNR  Sex: female        Discharge Diagnoses   Patient Active Problem List   Diagnosis Code    Essential hypertension, benign I10    Hyperlipemia E78.5    UTI (urinary tract infection) N39.0    Weakness R53.1    Hypertensive emergency I16.1    Ischemic stroke of frontal lobe [facility]) I63.9          Hospital Course   Presenting Problem:   Chief Complaint   Patient presents with    Fatigue        HPI: Eddy Vega is a 80 y.o. female with PMH of CVA and HTN who was brought to the ED with confusion and weakness. Her grandson reported his wife was unable to get the pt on the phone this morning. When they went to check on her she could not get out of bed, nor ambulate. She normally uses a rolling walker. She additionally was having difficulty talking and was leaning to the left. UA was dirty in the ED and CT just showed old CVA. BP was elevated to 220/110s in the ED. Hospital Course:   1. R frontal CVA - This was seen on MRI. Echo showed a R to L shunt with bubble study. ASA dose was increased but she is not a good candidate for anticoagulation. Carotid dopplers were unremarkable. LDL was 110 and pt was started on atorvastatin. BP has been better controlled, averaging in the 578Y systolic. She will continue with PT and OT on discharge. 2. E.coli UTI - Pt has received 5 days of abx. Initially was onRocephin and was switched to Keflex yesterday. She will continue with 2 more days to complete a 7 day course. 3. Dementia - Continue Aricept. 4. HTN - Valsartan will be continued with dose increased to 320mg.     Consults: None    Procedures: none       Discharge Exam & Pertinent Results   VITALS:  Visit Vitals    /78 (BP 1 Location: Left arm, BP Patient Position: At rest)    Pulse 68    Temp 97.9 °F (36.6 °C)    Resp 20    Ht 5' 5\" (1.651 m)    Wt 55.4 kg (122 lb 1.6 oz)    SpO2 95%    Breastfeeding No    BMI 20.32 kg/m2      Physical Exam:  General: elderly  female resting in bed, oriented to person and place, month but not year  HEENT: NCAT, EOMI, OOP pink/moist   CV: RRR, no m/g/r   Lungs - CTAB, no wheezes/crackles   Abd - soft, NT, ND   Ext - no edema     Recent Pertinent Labs:  Lab Results   Component Value Date/Time    WBC 6.6 11/11/2017 05:56 AM    HGB 12.4 11/11/2017 05:56 AM    HCT 36.9 11/11/2017 05:56 AM     11/11/2017 05:56 AM     11/12/2017 05:46 AM    K 4.1 11/12/2017 05:46 AM     (H) 11/12/2017 05:46 AM    CO2 24 11/12/2017 05:46 AM    BUN 30 (H) 11/12/2017 05:46 AM     (H) 11/12/2017 05:46 AM       Additional Test Results: All Micro Results     Procedure Component Value Units Date/Time    CULTURE, URINE [[id number]]  (Abnormal)  (Susceptibility) Collected:  11/09/17 1928    Order Status:  Completed Specimen:  Urine from Clean catch Updated:  11/13/17 0837     Special Requests: NO SPECIAL REQUESTS        Culture result:         >100,000 COLONIES/mL ESCHERICHIA COLI (A)          Imaging:     DUPLEX CAROTID BILATERAL   Final Result   Impression:   1. Less than 50% stenosis of the right internal carotid artery. 2. Less than 50% stenosis of the left internal carotid artery. Moderate plaque disease with elevated ICA to CCA ratios. Suspect a degree of   stenosis may be more significant than the ultrasound velocities demonstrate a CT   angiogram could be performed to further evaluate. MRI BRAIN WO CONT   Final Result   IMPRESSION:      1. Acute to early subacute infarct in the right frontal lobe. 2. Old infarcts in the cerebellum and left parietal lobe.       3. Advanced cerebral volume loss with hippocampal atrophy and white matter   findings compatible with chronic small vessel ischemic disease. CT HEAD WO CONT   Final Result   Impression:    1. No evidence of hemorrhage. 2. Small bilateral cerebellar hypodensities are likely lacunar infarcts, age   indeterminate. 3. Extensive diffuse chronic changes. XR CHEST PORT   Final Result   IMPRESSION: No acute disease. Cardiomegaly. Discharge Medications   Current Discharge Medication List      START taking these medications    Details   acetaminophen (TYLENOL) 325 mg tablet Take 2 Tabs by mouth every four (4) hours as needed for Fever (Temperature greater than 100.4 degrees Fahrenheit if taking PO). Qty: 30 Tab, Refills: 0      aspirin (ASPIRIN) 325 mg tablet Take 1 Tab by mouth daily. Qty: 30 Tab, Refills: 0      cephALEXin (KEFLEX) 500 mg capsule Take 1 Cap by mouth every eight (8) hours for 2 days. Qty: 6 Cap, Refills: 0      atorvastatin (LIPITOR) 40 mg tablet Take 1 Tab by mouth daily. Qty: 30 Tab, Refills: 0         CONTINUE these medications which have NOT CHANGED    Details   rOPINIRole (REQUIP) 0.5 mg tablet Take 0.5 mg by mouth nightly. solifenacin (VESICARE) 10 mg tablet TAKE 1 TABLET BY MOUTH EVERY DAY  Qty: 90 Tab, Refills: 3      valsartan (DIOVAN) 320 mg tablet Take 1 Tab by mouth daily.  Indications: hypertension  Qty: 90 Tab, Refills: 3      donepezil (ARICEPT) 5 mg tablet TAKE 1 TABLET BY MOUTH EVERY EVENING  Qty: 90 Tab, Refills: 3         STOP taking these medications       aspirin delayed-release 81 mg tablet Comments:   Reason for Stopping:                Discharge Follow up and Special Instructions   Disposition: STR  Follow up with: Future Appointments  Date Time Provider Felecia Rodriguezi   2/15/2018 10:50 AM PST LAB [facility] PST PST   3/8/2018 2:00 PM Grace Flores MD [facility] PST PST     Condition: fair  Diet: DIET NUTRITIONAL SUPPLEMENTS Lunch, Dinner; Glucerna (vanilla)  DIET DIABETIC CONSISTENT CARB Pureed  Activity: As tolerated  Restrictions: per PT    Time spent on care of coordination of discharge was 35 minutes for chart review, medication reconciliation, patient education, and coordination of services with case management and nursing.      Chantal Contreras MD  [facility] Hospitalist  11/14/17, 2:03 PM

## 2017-11-14 NOTE — PROGRESS NOTES
Pt has developed new cough since admission and sounds congested. Has not been out of bed for past 3 days due to weakness from stroke. Encourage pt to cough and deep breath. PT scheduled to work with pt today. Pt was able to stand and walk to chair with PT. Right knee swollen and pt c/o of some pain. Knee is not warm to touch nor red. Knee was slightly swollen yesterday but appears to be more swollen today. Will inform MD.     Also family is concern about pt's BP elevating overnight. Systolic pressure appears to be >165 for pas three nights. Family states BP med was recently switched due to kidney fxn. Wondering if pt should receive BP med at bedtime to better control BP over night.

## 2017-11-14 NOTE — PROGRESS NOTES
STG: Pt will tolerate pureed/thin liquids without overt signs/sx of aspiration with 100% accuracy  STG: Pt will participate with cognitive-linguistic assessment x1  LTG: Pt will tolerate the least restrictive diet at discharge without respiratory compromise    Speech language pathology: bedside swallow note: Daily Note 1    NAME/AGE/GENDER: Erich Humphrey is a 80 y.o. female  DATE: 11/14/2017  PRIMARY DIAGNOSIS: Weakness  UTI (urinary tract infection)  Hypertensive emergency  UTI (urinary tract infection)  Hypertensive emergency  UTI (urinary tract infection)       ICD-10: Treatment Diagnosis: dysphagia; oral R13.11  INTERDISCIPLINARY COLLABORATION: Registered Nurse  PRECAUTIONS/ALLERGIES: Review of patient's allergies indicates no known allergies. ASSESSMENT:Patient seen for diet tolerance, sitting in bedside chair with noontime meal present. Patient had consumed minimal amount of meal and required encouragement for trials with clinician. Delayed swallow initiation noted with puree trials. She consumed consecutive sips of thin liquids and nutritional supplement via straw without overt signs or symptoms of airway compromise. Mechanical soft trials presented; however increased mastication time noted. Increased fatigue with mechanical soft trials and patient becoming increasing drowsy as session progressed. Recommend continue pureed/thin liquids. Meds crushed in puree. Encourage consumption of supplements as patient continues with limited po intake. Will follow for dysphagia and cognition. Patient will benefit from skilled intervention to address the below impairments. ?????? ? ? This section established at most recent assessment??????????  PROBLEM LIST (Impairments causing functional limitations):  1. Dysphagia  2. cognition  REHABILITATION POTENTIAL FOR STATED GOALS: Good  PLAN OF CARE:   Patient will benefit from skilled intervention to address the following impairments.   RECOMMENDATIONS AND PLANNED INTERVENTIONS (Benefits and precautions of therapy have been discussed with the patient.):  · PO:  Pureed  · Liquids:  regular thin  MEDICATIONS:  · Crushed in puree  COMPENSATORY STRATEGIES/MODIFICATIONS INCLUDING:  · Small sips and bites  OTHER RECOMMENDATIONS (including follow up treatment recommendations): · Family training/education  · Patient education  RECOMMENDED DIET MODIFICATIONS DISCUSSED WITH:  · Nursing  · Patient  FREQUENCY/DURATION: Continue to follow patient 3 times a week for duration of hospital stay to address above goals. RECOMMENDED REHABILITATION/EQUIPMENT: (at time of discharge pending progress): Due to the probability of continued deficits (see above) this patient will likely need continued skilled speech therapy after discharge. SUBJECTIVE:   Drowsy, minimal acceptance of po with patient stating \"I am just full\". History of Present Injury/Illness: Ms. Radha Gievns  has a past medical history of Essential hypertension, benign (11/8/2013); Expressive aphasia (11/10/2013); GERD (gastroesophageal reflux disease); H/O appendicitis; History of appendicitis; History of bradycardia (11/8/2013); History of complete eye exam (04/2016); History of CVA (cerebrovascular accident) (11/8/2013); History of peptic ulcer (11/8/2013); Hyperlipidemia; Hypertension; Overactive bladder; Restless leg syndrome; Type II or unspecified type diabetes mellitus without mention of complication, not stated as uncontrolled (11/8/2013); and Wears dentures. She also  has a past surgical history that includes appendectomy and abdominal wall defect repair. Present Symptoms: prolonged mastication; processing issues  Pain Intensity 1: 0  Current Medications:   No current facility-administered medications on file prior to encounter.       Current Outpatient Prescriptions on File Prior to Encounter   Medication Sig Dispense Refill    solifenacin (VESICARE) 10 mg tablet TAKE 1 TABLET BY MOUTH EVERY DAY 90 Tab 3    valsartan (DIOVAN) 160 mg tablet Take 1 Tab by mouth daily. Indications: hypertension 90 Tab 3    donepezil (ARICEPT) 5 mg tablet TAKE 1 TABLET BY MOUTH EVERY EVENING 90 Tab 3    aspirin delayed-release 81 mg tablet Take  by mouth daily. Current Dietary Status:  NPO pending consult      Social History/Home Situation: home alone  Home Environment: Trailer/mobile home  # Steps to Enter: 5  One/Two Story Residence: One story  Living Alone: Yes  Support Systems: Child(rahul), Family member(s)  Patient Expects to be Discharged to[de-identified] Rehabilitation facility  Current DME Used/Available at Home: Armando Sicard, Shower chair  OBJECTIVE:   Respiratory Status:        CXR Results: No acute disease. Cardiomegaly. MRI/CT Results:Acute to early subacute infarct in the right frontal lobe.     2. Old infarcts in the cerebellum and left parietal lobe.     3. Advanced cerebral volume loss with hippocampal atrophy and white matter  findings compatible with chronic small vessel ischemic disease. Oral Motor Structure/Speech:  Oral-Motor Structure/Motor Speech  Labial: No impairment  Dentition: Edentulous  Oral Hygiene: Aedquate  Lingual: Decreased rate    Cognitive and Communication Status:  Neurologic State: Alert  Orientation Level: Oriented to person;Oriented to place; Disoriented to time  Cognition: Follows commands  Perception: Appears intact  Perseveration: No perseveration noted  Safety/Judgement: Fall prevention    BEDSIDE SWALLOW EVALUATION  Oral Assessment:  Oral Assessment  Labial: No impairment  Dentition: Edentulous  Oral Hygiene: Aedquate  Lingual: Decreased rate  P.O. Trials:  Patient Position: Bedside chair    The patient was given tsp to straw amounts of the following:   Consistency Presented:  Thin liquid;Puree;Mechanical soft  How Presented: Self-fed/presented;Cup/sip;Spoon;Straw    ORAL PHASE:  Bolus Acceptance: No impairment  Bolus Formation/Control: No impairment  Propulsion: Delayed (# of seconds)  Type of Impairment: Delayed;Mastication  Oral Residue: None    PHARYNGEAL PHASE:  Initiation of Swallow: Delayed (# of seconds)  Laryngeal Elevation: Functional  Aspiration Signs/Symptoms: None  Vocal Quality: No impairment           Pharyngeal Phase Characteristics: No impairment, issues, or problems     OTHER OBSERVATIONS:  Rate/bite size: WNL   Endurance: WNL     Tool Used: Dysphagia Outcome and Severity Scale (KRISTINE)    Score Comments   Normal Diet  [] 7 With no strategies or extra time needed   Functional Swallow  [] 6 May have mild oral or pharyngeal delay       Mild Dysphagia    [] 5 Which may require one diet consistency restricted (those who demonstrate penetration which is entirely cleared on MBS would be included)   Mild-Moderate Dysphagia  [x] 4 With 1-2 diet consistencies restricted       Moderate Dysphagia  [] 3 With 2 or more diet consistencies restricted       Moderately Severe Dysphagia  [] 2 With partial PO strategies (trials with ST only)       Severe Dysphagia  [] 1 With inability to tolerate any PO safely          Score:  Initial: 4 Most Recent: X (Date: -- )   Interpretation of Tool: The Dysphagia Outcome and Severity Scale (KRISTINE) is a simple, easy-to-use, 7-point scale developed to systematically rate the functional severity of dysphagia based on objective assessment and make recommendations for diet level, independence level, and type of nutrition. Score 7 6 5 4 3 2 1   Modifier CH CI CJ CK CL CM CN   ?  Swallowing:     - CURRENT STATUS: CK - 40%-59% impaired, limited or restricted    - GOAL STATUS:  CJ - 20%-39% impaired, limited or restricted    - D/C STATUS:  ---------------To be determined---------------  Payor: Genia Bermanivers / Plan: [address] [place] [address] HMO / Product Type: Managed Care Medicare /     TREATMENT:    (In addition to Assessment/Re-Assessment sessions the following treatments were rendered)  Dysphagia Activities: Activities/Procedures listed utilized to improve progress in diet tolerance and swallow safety. Required minimal cueing to improve swallow safety. MODALITIES:                                                                  ORAL MOTOR  EXERCISES:                                                                                                                                                                      LARYNGEAL / PHARYNGEAL EXERCISES:                                                                                                                                     __________________________________________________________________________________________________  Safety:   After treatment position/precautions:  · Up in chair  Progression/Medical Necessity:   · Skilled intervention continues to be required due to patient still consuming a modified diet and unable to attend/participate in therapy as expected. Compliance with Program/Exercises: Will assess as treatment progresses. Reason for Continuation of Services/Other Comments:  · Patient continues to require skilled intervention due to patient unable to attend/participate in therapy as expected. Recommendations/Intent for next treatment session: \"Treatment next visit will focus on diet tolerance; po trialscognitive assessment\".     Total Treatment Duration:  Time In: 1229  Time Out: 1241     SKY Salinas, CCC-SLP, CBIS

## 2017-11-17 ENCOUNTER — PATIENT OUTREACH (OUTPATIENT)
Dept: CASE MANAGEMENT | Age: 82
End: 2017-11-17

## 2017-11-17 NOTE — PROGRESS NOTES
Downtime progress note entry for [facility] Care : [address] Follow up Outreach Note   Outreach type:  Patient name: Yimi Seals  : 1924  EPM:[id number]    Date/Time of Outreach: 17     Reason for follow-up:   Initial call   Disease specific complaints/issues: Cerebral infarction       Patient progress towards goals set from last contact:   17 LVM with pt. Left Call back # and encourage to call CC. LVM with PCP. Has patient attended any PCP or specialist follow-up appointments since last contact? What was outcome of appointment? When is next follow-up scheduled? Review medications. Any medication changes since last outreach? Does patient have any questions or issues related to their medications? Home health active? If yes - any issue? Progress? Referrals needed?  (SW, Diabetes education, HH, etc. )    Other issues/Miscellaneous?  (Transportation, access to meals, ability to perform ADLs, adequate caregiver support, etc.)              Next Outreach Scheduled:      Next Steps/Goals:      Care  completing call:
